# Patient Record
Sex: FEMALE | Race: WHITE | Employment: FULL TIME | URBAN - METROPOLITAN AREA
[De-identification: names, ages, dates, MRNs, and addresses within clinical notes are randomized per-mention and may not be internally consistent; named-entity substitution may affect disease eponyms.]

---

## 2019-09-17 ENCOUNTER — APPOINTMENT (OUTPATIENT)
Dept: CT IMAGING | Age: 39
End: 2019-09-17
Payer: COMMERCIAL

## 2019-09-17 ENCOUNTER — HOSPITAL ENCOUNTER (EMERGENCY)
Age: 39
Discharge: HOME | End: 2019-09-17
Payer: COMMERCIAL

## 2019-09-17 VITALS
TEMPERATURE: 98.1 F | DIASTOLIC BLOOD PRESSURE: 83 MMHG | WEIGHT: 293 LBS | SYSTOLIC BLOOD PRESSURE: 107 MMHG | OXYGEN SATURATION: 99 % | HEIGHT: 67 IN | BODY MASS INDEX: 45.99 KG/M2 | RESPIRATION RATE: 16 BRPM | HEART RATE: 96 BPM

## 2019-09-17 DIAGNOSIS — R42 EPISODIC LIGHTHEADEDNESS: Primary | ICD-10-CM

## 2019-09-17 LAB
ALBUMIN SERPL-MCNC: 4 GM/DL (ref 3.4–5)
ALP BLD-CCNC: 59 IU/L (ref 40–128)
ALT SERPL-CCNC: 13 U/L (ref 10–40)
ANION GAP SERPL CALCULATED.3IONS-SCNC: 11 MMOL/L (ref 4–16)
AST SERPL-CCNC: 14 IU/L (ref 15–37)
BACTERIA: ABNORMAL /HPF
BASOPHILS ABSOLUTE: 0.1 K/CU MM
BASOPHILS RELATIVE PERCENT: 0.4 % (ref 0–1)
BILIRUB SERPL-MCNC: 0.7 MG/DL (ref 0–1)
BILIRUBIN URINE: NEGATIVE MG/DL
BLOOD, URINE: ABNORMAL
BUN BLDV-MCNC: 11 MG/DL (ref 6–23)
CALCIUM SERPL-MCNC: 9.3 MG/DL (ref 8.3–10.6)
CHLORIDE BLD-SCNC: 103 MMOL/L (ref 99–110)
CLARITY: CLEAR
CO2: 24 MMOL/L (ref 21–32)
COLOR: YELLOW
CREAT SERPL-MCNC: 0.8 MG/DL (ref 0.6–1.1)
DIFFERENTIAL TYPE: ABNORMAL
EKG ATRIAL RATE: 105 BPM
EKG ATRIAL RATE: 94 BPM
EKG DIAGNOSIS: NORMAL
EKG DIAGNOSIS: NORMAL
EKG P AXIS: 53 DEGREES
EKG P AXIS: 68 DEGREES
EKG P-R INTERVAL: 142 MS
EKG P-R INTERVAL: 146 MS
EKG Q-T INTERVAL: 358 MS
EKG Q-T INTERVAL: 368 MS
EKG QRS DURATION: 94 MS
EKG QRS DURATION: 96 MS
EKG QTC CALCULATION (BAZETT): 460 MS
EKG QTC CALCULATION (BAZETT): 473 MS
EKG R AXIS: -31 DEGREES
EKG R AXIS: -4 DEGREES
EKG T AXIS: 28 DEGREES
EKG T AXIS: 48 DEGREES
EKG VENTRICULAR RATE: 105 BPM
EKG VENTRICULAR RATE: 94 BPM
EOSINOPHILS ABSOLUTE: 0.1 K/CU MM
EOSINOPHILS RELATIVE PERCENT: 1.1 % (ref 0–3)
GFR AFRICAN AMERICAN: >60 ML/MIN/1.73M2
GFR NON-AFRICAN AMERICAN: >60 ML/MIN/1.73M2
GLUCOSE BLD-MCNC: 104 MG/DL (ref 70–99)
GLUCOSE, URINE: NEGATIVE MG/DL
GONADOTROPIN, CHORIONIC (HCG) QUANT: NORMAL UIU/ML
HCT VFR BLD CALC: 47.2 % (ref 37–47)
HEMOGLOBIN: 15 GM/DL (ref 12.5–16)
IMMATURE NEUTROPHIL %: 0.3 % (ref 0–0.43)
KETONES, URINE: NEGATIVE MG/DL
LEUKOCYTE ESTERASE, URINE: NEGATIVE
LYMPHOCYTES ABSOLUTE: 2.9 K/CU MM
LYMPHOCYTES RELATIVE PERCENT: 24.7 % (ref 24–44)
MCH RBC QN AUTO: 28.8 PG (ref 27–31)
MCHC RBC AUTO-ENTMCNC: 31.8 % (ref 32–36)
MCV RBC AUTO: 90.8 FL (ref 78–100)
MONOCYTES ABSOLUTE: 0.8 K/CU MM
MONOCYTES RELATIVE PERCENT: 7.2 % (ref 0–4)
MUCUS: ABNORMAL HPF
NITRITE URINE, QUANTITATIVE: NEGATIVE
NON SQUAM EPI CELLS: <1 /HPF
NUCLEATED RBC %: 0 %
PDW BLD-RTO: 12.3 % (ref 11.7–14.9)
PH, URINE: 5 (ref 5–8)
PLATELET # BLD: 299 K/CU MM (ref 140–440)
PMV BLD AUTO: 9 FL (ref 7.5–11.1)
POTASSIUM SERPL-SCNC: 3.9 MMOL/L (ref 3.5–5.1)
PROTEIN UA: NEGATIVE MG/DL
RBC # BLD: 5.2 M/CU MM (ref 4.2–5.4)
RBC URINE: 3 /HPF (ref 0–6)
SEGMENTED NEUTROPHILS ABSOLUTE COUNT: 7.7 K/CU MM
SEGMENTED NEUTROPHILS RELATIVE PERCENT: 66.3 % (ref 36–66)
SODIUM BLD-SCNC: 138 MMOL/L (ref 135–145)
SPECIFIC GRAVITY UA: 1.05 (ref 1–1.03)
SPECIFIC GRAVITY UA: ABNORMAL (ref 1–1.03)
SQUAMOUS EPITHELIAL: 2 /HPF
TOTAL IMMATURE NEUTOROPHIL: 0.04 K/CU MM
TOTAL NUCLEATED RBC: 0 K/CU MM
TOTAL PROTEIN: 7.5 GM/DL (ref 6.4–8.2)
TRICHOMONAS: ABNORMAL /HPF
TROPONIN T: <0.01 NG/ML
UROBILINOGEN, URINE: NORMAL MG/DL (ref 0.2–1)
WBC # BLD: 11.6 K/CU MM (ref 4–10.5)
WBC UA: <1 /HPF (ref 0–5)

## 2019-09-17 PROCEDURE — 6360000004 HC RX CONTRAST MEDICATION: Performed by: PHYSICIAN ASSISTANT

## 2019-09-17 PROCEDURE — 71275 CT ANGIOGRAPHY CHEST: CPT

## 2019-09-17 PROCEDURE — 2580000003 HC RX 258: Performed by: PHYSICIAN ASSISTANT

## 2019-09-17 PROCEDURE — 84484 ASSAY OF TROPONIN QUANT: CPT

## 2019-09-17 PROCEDURE — 93005 ELECTROCARDIOGRAM TRACING: CPT | Performed by: EMERGENCY MEDICINE

## 2019-09-17 PROCEDURE — 93010 ELECTROCARDIOGRAM REPORT: CPT | Performed by: INTERNAL MEDICINE

## 2019-09-17 PROCEDURE — 80053 COMPREHEN METABOLIC PANEL: CPT

## 2019-09-17 PROCEDURE — 81001 URINALYSIS AUTO W/SCOPE: CPT

## 2019-09-17 PROCEDURE — 93005 ELECTROCARDIOGRAM TRACING: CPT | Performed by: PHYSICIAN ASSISTANT

## 2019-09-17 PROCEDURE — 96360 HYDRATION IV INFUSION INIT: CPT

## 2019-09-17 PROCEDURE — 85025 COMPLETE CBC W/AUTO DIFF WBC: CPT

## 2019-09-17 PROCEDURE — 99284 EMERGENCY DEPT VISIT MOD MDM: CPT

## 2019-09-17 PROCEDURE — 96361 HYDRATE IV INFUSION ADD-ON: CPT

## 2019-09-17 PROCEDURE — 84702 CHORIONIC GONADOTROPIN TEST: CPT

## 2019-09-17 RX ORDER — 0.9 % SODIUM CHLORIDE 0.9 %
1000 INTRAVENOUS SOLUTION INTRAVENOUS ONCE
Status: COMPLETED | OUTPATIENT
Start: 2019-09-17 | End: 2019-09-17

## 2019-09-17 RX ORDER — 0.9 % SODIUM CHLORIDE 0.9 %
1000 INTRAVENOUS SOLUTION INTRAVENOUS ONCE
Status: DISCONTINUED | OUTPATIENT
Start: 2019-09-17 | End: 2019-09-17 | Stop reason: HOSPADM

## 2019-09-17 RX ORDER — SODIUM CHLORIDE 0.9 % (FLUSH) 0.9 %
10 SYRINGE (ML) INJECTION 2 TIMES DAILY
Status: DISCONTINUED | OUTPATIENT
Start: 2019-09-17 | End: 2019-09-17 | Stop reason: HOSPADM

## 2019-09-17 RX ADMIN — IOPAMIDOL 80 ML: 755 INJECTION, SOLUTION INTRAVENOUS at 04:06

## 2019-09-17 RX ADMIN — SODIUM CHLORIDE 1000 ML: 9 INJECTION, SOLUTION INTRAVENOUS at 03:29

## 2019-09-17 RX ADMIN — Medication 10 ML: at 04:06

## 2019-09-17 SDOH — HEALTH STABILITY: MENTAL HEALTH: HOW OFTEN DO YOU HAVE A DRINK CONTAINING ALCOHOL?: NEVER

## 2019-09-17 ASSESSMENT — PAIN DESCRIPTION - PAIN TYPE: TYPE: ACUTE PAIN

## 2019-09-17 ASSESSMENT — PAIN SCALES - GENERAL: PAINLEVEL_OUTOF10: 7

## 2019-09-17 ASSESSMENT — PAIN DESCRIPTION - ORIENTATION: ORIENTATION: LEFT

## 2019-09-17 NOTE — ED PROVIDER NOTES
RBC % 0.0 %    Total Nucleated RBC 0.0 K/CU MM    Total Immature Neutrophil 0.04 K/CU MM    Immature Neutrophil % 0.3 0 - 0.43 %   CMP   Result Value Ref Range    Sodium 138 135 - 145 MMOL/L    Potassium 3.9 3.5 - 5.1 MMOL/L    Chloride 103 99 - 110 mMol/L    CO2 24 21 - 32 MMOL/L    BUN 11 6 - 23 MG/DL    CREATININE 0.8 0.6 - 1.1 MG/DL    Glucose 104 (H) 70 - 99 MG/DL    Calcium 9.3 8.3 - 10.6 MG/DL    Alb 4.0 3.4 - 5.0 GM/DL    Total Protein 7.5 6.4 - 8.2 GM/DL    Total Bilirubin 0.7 0.0 - 1.0 MG/DL    ALT 13 10 - 40 U/L    AST 14 (L) 15 - 37 IU/L    Alkaline Phosphatase 59 40 - 128 IU/L    GFR Non-African American >60 >60 mL/min/1.73m2    GFR African American >60 >60 mL/min/1.73m2    Anion Gap 11 4 - 16   HCG, Serum, Quantitative, Pregnancy (Lab)   Result Value Ref Range    hCG Quant 0.6                                          UIU/ML    hCG Quant EXPECTED VALUES IN PREGNANCY UIU/ML    hCG Quant    7-50               0.2-1 WEEK UIU/ML    hCG Quant                 1-2 WEEKS UIU/ML    hCG Quant    100-5000           2-3 WEEKS UIU/ML    hCG Quant    500-10,000         3-4 WEEKS UIU/ML    hCG Quant    1000-50,000        4-5 WEEKS UIU/ML    hCG Quant    10,000-100,000     5-6 WEEKS UIU/ML    hCG Quant    15,000-200,000     6-8 WEEKS UIU/ML    hCG Quant    10,000-100,000     2-3 MONTHS UIU/ML   Troponin   Result Value Ref Range    Troponin T <0.010 <0.01 NG/ML   Urinalysis   Result Value Ref Range    Color, UA YELLOW YELLOW    Clarity, UA CLEAR CLEAR    Glucose, Urine NEGATIVE NEGATIVE MG/DL    Bilirubin Urine NEGATIVE NEGATIVE MG/DL    Ketones, Urine NEGATIVE NEGATIVE MG/DL    Specific Gravity, UA 1.049 (H) 1.001 - 1.035    Specific Gravity, UA (H) 1.001 - 1.035     (NOTE)  CONSIDER URINE OSMOLARITY TEST IF CLINICALLY INDICATED        Blood, Urine SMALL (A) NEGATIVE    pH, Urine 5.0 5.0 - 8.0    Protein, UA NEGATIVE NEGATIVE MG/DL    Urobilinogen, Urine NORMAL 0.2 - 1.0 MG/DL    Nitrite Urine, Quantitative

## 2019-09-17 NOTE — ED NOTES
Pt states being from [address] and was here visiting a friend. Was driving back home when she began feeling dizzy. States pulling over and began having lower back pain and vomiting. States never felt like this before. Pt also states that jaw on both sides is feeling heavy.       Janny Harrison RN  09/17/19 0808

## 2024-04-01 ENCOUNTER — APPOINTMENT (OUTPATIENT)
Dept: PRIMARY CARE | Facility: CLINIC | Age: 44
End: 2024-04-01
Payer: COMMERCIAL

## 2024-04-03 ENCOUNTER — APPOINTMENT (OUTPATIENT)
Dept: RADIOLOGY | Facility: CLINIC | Age: 44
End: 2024-04-03
Payer: COMMERCIAL

## 2024-04-03 DIAGNOSIS — Z12.31 SCREENING MAMMOGRAM FOR BREAST CANCER: ICD-10-CM

## 2024-04-10 ENCOUNTER — HOSPITAL ENCOUNTER (OUTPATIENT)
Dept: RADIOLOGY | Facility: CLINIC | Age: 44
Discharge: HOME | End: 2024-04-10
Payer: COMMERCIAL

## 2024-04-10 ENCOUNTER — ANCILLARY ORDERS (OUTPATIENT)
Dept: RADIOLOGY | Facility: CLINIC | Age: 44
End: 2024-04-10
Payer: COMMERCIAL

## 2024-04-10 VITALS — WEIGHT: 293 LBS | HEIGHT: 67 IN | BODY MASS INDEX: 45.99 KG/M2

## 2024-04-10 DIAGNOSIS — N63.20 MASSES OF BOTH BREASTS: Primary | ICD-10-CM

## 2024-04-10 DIAGNOSIS — Z12.31 SCREENING MAMMOGRAM FOR BREAST CANCER: ICD-10-CM

## 2024-04-10 DIAGNOSIS — N63.10 MASSES OF BOTH BREASTS: Primary | ICD-10-CM

## 2024-04-10 PROCEDURE — 77063 BREAST TOMOSYNTHESIS BI: CPT | Performed by: RADIOLOGY

## 2024-04-10 PROCEDURE — 77067 SCR MAMMO BI INCL CAD: CPT | Performed by: RADIOLOGY

## 2024-04-10 PROCEDURE — 77067 SCR MAMMO BI INCL CAD: CPT

## 2024-04-16 ENCOUNTER — TELEPHONE (OUTPATIENT)
Dept: OBSTETRICS AND GYNECOLOGY | Facility: CLINIC | Age: 44
End: 2024-04-16
Payer: COMMERCIAL

## 2024-04-17 ENCOUNTER — HOSPITAL ENCOUNTER (OUTPATIENT)
Dept: RADIOLOGY | Facility: CLINIC | Age: 44
Discharge: HOME | End: 2024-04-17
Payer: COMMERCIAL

## 2024-04-17 DIAGNOSIS — N63.10 MASSES OF BOTH BREASTS: ICD-10-CM

## 2024-04-17 DIAGNOSIS — N63.20 MASSES OF BOTH BREASTS: ICD-10-CM

## 2024-04-17 PROCEDURE — 77065 DX MAMMO INCL CAD UNI: CPT | Mod: RIGHT SIDE | Performed by: STUDENT IN AN ORGANIZED HEALTH CARE EDUCATION/TRAINING PROGRAM

## 2024-04-17 PROCEDURE — 77061 BREAST TOMOSYNTHESIS UNI: CPT | Mod: RT

## 2024-04-17 PROCEDURE — 77061 BREAST TOMOSYNTHESIS UNI: CPT | Mod: RIGHT SIDE | Performed by: STUDENT IN AN ORGANIZED HEALTH CARE EDUCATION/TRAINING PROGRAM

## 2024-04-17 PROCEDURE — 76982 USE 1ST TARGET LESION: CPT

## 2024-04-17 PROCEDURE — 76642 ULTRASOUND BREAST LIMITED: CPT | Mod: RIGHT SIDE | Performed by: STUDENT IN AN ORGANIZED HEALTH CARE EDUCATION/TRAINING PROGRAM

## 2024-04-17 PROCEDURE — 76642 ULTRASOUND BREAST LIMITED: CPT | Mod: 50

## 2024-04-18 ENCOUNTER — OFFICE VISIT (OUTPATIENT)
Dept: OBSTETRICS AND GYNECOLOGY | Facility: CLINIC | Age: 44
End: 2024-04-18
Payer: COMMERCIAL

## 2024-04-18 VITALS
DIASTOLIC BLOOD PRESSURE: 70 MMHG | SYSTOLIC BLOOD PRESSURE: 110 MMHG | HEIGHT: 67 IN | WEIGHT: 293 LBS | BODY MASS INDEX: 45.99 KG/M2

## 2024-04-18 DIAGNOSIS — Z01.419 PAP TEST, AS PART OF ROUTINE GYNECOLOGICAL EXAMINATION: ICD-10-CM

## 2024-04-18 DIAGNOSIS — Z01.419 WOMEN'S ANNUAL ROUTINE GYNECOLOGICAL EXAMINATION: Primary | ICD-10-CM

## 2024-04-18 PROCEDURE — 87624 HPV HI-RISK TYP POOLED RSLT: CPT

## 2024-04-18 PROCEDURE — 99396 PREV VISIT EST AGE 40-64: CPT | Performed by: OBSTETRICS & GYNECOLOGY

## 2024-04-18 PROCEDURE — 88142 CYTOPATH C/V THIN LAYER: CPT

## 2024-04-18 PROCEDURE — 1036F TOBACCO NON-USER: CPT | Performed by: OBSTETRICS & GYNECOLOGY

## 2024-04-18 ASSESSMENT — ENCOUNTER SYMPTOMS
GASTROINTESTINAL NEGATIVE: 0
CARDIOVASCULAR NEGATIVE: 0
HEMATOLOGIC/LYMPHATIC NEGATIVE: 0
EYES NEGATIVE: 0
ENDOCRINE NEGATIVE: 0
MUSCULOSKELETAL NEGATIVE: 0
CONSTITUTIONAL NEGATIVE: 0
PSYCHIATRIC NEGATIVE: 0
NEUROLOGICAL NEGATIVE: 0
RESPIRATORY NEGATIVE: 0
ALLERGIC/IMMUNOLOGIC NEGATIVE: 0

## 2024-04-18 ASSESSMENT — PATIENT HEALTH QUESTIONNAIRE - PHQ9
SUM OF ALL RESPONSES TO PHQ9 QUESTIONS 1 AND 2: 0
1. LITTLE INTEREST OR PLEASURE IN DOING THINGS: NOT AT ALL
2. FEELING DOWN, DEPRESSED OR HOPELESS: NOT AT ALL

## 2024-04-18 ASSESSMENT — PAIN SCALES - GENERAL: PAINLEVEL: 0-NO PAIN

## 2024-04-18 NOTE — RESULT ENCOUNTER NOTE
Follow-up diagnostic mammogram shows bilateral probable benign cysts.  Recommend short-term follow-up 6 months

## 2024-04-18 NOTE — PROGRESS NOTES
"Chalise N Merlin is a 43 y.o. female who is here for a routine exam. PCP = Jonathan Garcia MD  Patient for annual exam no complaints did discuss that she had some pinkish discharge post intercourse approximately 3 weeks ago.  Notes it was painful during that intercourse.  Usually sex is not painful and she does not have any issues    Review of Systems  Episode of vaginal bleeding and pain with intercourse.  All other systems negative    Physical Exam  Constitutional:       Appearance: Normal appearance. She is obese.   Genitourinary:      Genitourinary Comments: External genitalia unremarkable  Vagina clear  Cervix closed uterus normal does not appear enlarged  Adnexa without masses or tenderness  Perineum without inflammation or lesions    Breast without masses tenderness or nipple discharge, normal appearance   Breasts:     Breasts are soft.     Right: Normal.      Left: Normal.   HENT:      Head: Normocephalic.      Nose: Nose normal.   Eyes:      Pupils: Pupils are equal, round, and reactive to light.   Cardiovascular:      Rate and Rhythm: Normal rate and regular rhythm.   Pulmonary:      Effort: Pulmonary effort is normal.      Breath sounds: Normal breath sounds.   Abdominal:      General: Abdomen is flat. Bowel sounds are normal.      Palpations: Abdomen is soft.   Musculoskeletal:         General: Normal range of motion.      Cervical back: Normal range of motion and neck supple.   Neurological:      General: No focal deficit present.      Mental Status: She is alert.   Skin:     General: Skin is warm and dry.   Psychiatric:         Mood and Affect: Mood normal.         Behavior: Behavior normal.         Thought Content: Thought content normal.         Judgment: Judgment normal.     Objective   /70   Ht 1.702 m (5' 7\")   Wt (!) 157 kg (346 lb)   LMP 2024 (Exact Date)   BMI 54.19 kg/m²   OB History          10    Para   5    Term   5            AB   5    Living   5         SAB "   2    IAB        Ectopic        Multiple        Live Births                  GynHx:  Menarche/Menopause: 11  Periods are regular every 28-30 days, lasting 5 days.  Dysmenorrhea: none.   Cyclic symptoms include none.    Social History     Substance and Sexual Activity   Sexual Activity Yes    Partners: Male    Birth control/protection: None     Current contraception: None  STIs: none    Substance:   Tobacco Use: Low Risk  (4/18/2024)    Patient History     Smoking Tobacco Use: Never     Smokeless Tobacco Use: Never     Passive Exposure: Not on file      Social History     Substance and Sexual Activity   Drug Use Never      Social History     Substance and Sexual Activity   Alcohol Use Yes    Comment: Socially     Abuse: No  Depression Screen:   Denies history of depression    Past med hx and past surg hx reviewed     Assessment/Plan      Markable annual GYN exam.  Patient is sexually active steady partner declining STD testing.  Patient currently is not using birth control.  Discussed with patient encouraged to least use barrier methods.  Patient will consider.  Discussed diet and exercise.  Patient notes she has lost some weight recently.  Mammogram results reviewed.  Pap smear obtained.  Return to office in 1 year or as needed

## 2024-04-19 PROBLEM — N63.20 MASSES OF BOTH BREASTS: Status: ACTIVE | Noted: 2024-04-17

## 2024-04-19 PROBLEM — N63.10 MASSES OF BOTH BREASTS: Status: ACTIVE | Noted: 2024-04-17

## 2024-04-19 PROBLEM — E66.01 MORBID OBESITY DUE TO EXCESS CALORIES (MULTI): Status: ACTIVE | Noted: 2017-03-31

## 2024-04-19 PROBLEM — N93.9 ABNORMAL UTERINE BLEEDING: Status: ACTIVE | Noted: 2024-04-19

## 2024-04-19 PROBLEM — O20.0 THREATENED ABORTION IN FIRST TRIMESTER (HHS-HCC): Status: ACTIVE | Noted: 2024-04-19

## 2024-04-19 PROBLEM — R35.0 INCREASED URINARY FREQUENCY: Status: ACTIVE | Noted: 2024-04-19

## 2024-04-19 PROBLEM — O36.80X0 PREGNANCY, LOCATION UNKNOWN (HHS-HCC): Status: ACTIVE | Noted: 2024-04-19

## 2024-04-19 PROBLEM — R73.09 ABNORMAL BLOOD SUGAR: Status: ACTIVE | Noted: 2024-04-19

## 2024-04-19 PROBLEM — Z86.711 PERSONAL HISTORY OF PULMONARY EMBOLISM: Status: ACTIVE | Noted: 2018-11-14

## 2024-04-19 PROBLEM — R74.01 HIGH ALANINE AMINOTRANSFERASE (ALT) LEVEL: Status: ACTIVE | Noted: 2024-03-25

## 2024-04-19 PROBLEM — N93.0 POSTCOITAL BLEEDING: Status: ACTIVE | Noted: 2024-04-19

## 2024-04-19 PROBLEM — K21.9 GASTROESOPHAGEAL REFLUX DISEASE: Status: ACTIVE | Noted: 2018-11-14

## 2024-04-19 PROBLEM — N92.6 IRREGULAR MENSES: Status: ACTIVE | Noted: 2024-04-19

## 2024-04-19 PROBLEM — N39.0 ACUTE LOWER URINARY TRACT INFECTION: Status: ACTIVE | Noted: 2024-04-19

## 2024-04-19 PROBLEM — R39.15 URGENCY OF URINATION: Status: ACTIVE | Noted: 2024-04-19

## 2024-04-19 PROBLEM — I10 ESSENTIAL (PRIMARY) HYPERTENSION: Status: ACTIVE | Noted: 2018-11-14

## 2024-04-19 PROBLEM — Z20.822 SUSPECTED COVID-19 VIRUS INFECTION: Status: ACTIVE | Noted: 2024-04-19

## 2024-04-19 PROBLEM — N94.19 FUNCTIONAL DYSPAREUNIA: Status: ACTIVE | Noted: 2024-04-19

## 2024-04-19 PROBLEM — H16.9 KERATITIS, RIGHT: Status: ACTIVE | Noted: 2024-04-19

## 2024-04-19 PROBLEM — R10.2 FEMALE PELVIC PAIN: Status: ACTIVE | Noted: 2024-04-19

## 2024-04-19 PROBLEM — T83.32XA MALPOSITIONED INTRAUTERINE DEVICE (IUD): Status: ACTIVE | Noted: 2024-04-19

## 2024-04-19 PROBLEM — N76.0 VAGINITIS AND VULVOVAGINITIS: Status: ACTIVE | Noted: 2020-10-19

## 2024-04-19 PROBLEM — R74.01 HIGH ASPARTATE AMINOTRANSFERASE LEVEL: Status: ACTIVE | Noted: 2024-03-25

## 2024-04-19 PROBLEM — R51.9 HEADACHE: Status: ACTIVE | Noted: 2024-04-19

## 2024-04-19 PROBLEM — R31.9 HEMATURIA OF UNDIAGNOSED CAUSE: Status: ACTIVE | Noted: 2024-04-19

## 2024-04-19 PROBLEM — Z97.5 PRESENCE OF INTRAUTERINE CONTRACEPTIVE DEVICE: Status: ACTIVE | Noted: 2024-04-19

## 2024-04-19 PROBLEM — N91.2 AMENORRHEA: Status: ACTIVE | Noted: 2024-04-19

## 2024-04-19 RX ORDER — AMLODIPINE BESYLATE 5 MG/1
5 TABLET ORAL DAILY
COMMUNITY
Start: 2024-03-06

## 2024-04-19 RX ORDER — ASPIRIN 81 MG/1
81 TABLET ORAL DAILY
COMMUNITY
Start: 2024-03-06

## 2024-04-19 RX ORDER — METOPROLOL SUCCINATE 50 MG/1
50 TABLET, EXTENDED RELEASE ORAL DAILY
COMMUNITY
Start: 2024-03-06

## 2024-04-19 RX ORDER — ATORVASTATIN CALCIUM 20 MG/1
20 TABLET, FILM COATED ORAL DAILY
COMMUNITY
Start: 2024-03-06

## 2024-04-22 ENCOUNTER — OFFICE VISIT (OUTPATIENT)
Dept: CARDIOLOGY | Facility: CLINIC | Age: 44
End: 2024-04-22
Payer: COMMERCIAL

## 2024-04-22 VITALS
WEIGHT: 293 LBS | DIASTOLIC BLOOD PRESSURE: 83 MMHG | HEART RATE: 63 BPM | BODY MASS INDEX: 45.99 KG/M2 | HEIGHT: 67 IN | OXYGEN SATURATION: 96 % | SYSTOLIC BLOOD PRESSURE: 121 MMHG

## 2024-04-22 DIAGNOSIS — R60.0 BILATERAL LOWER EXTREMITY EDEMA: ICD-10-CM

## 2024-04-22 DIAGNOSIS — K75.81 NASH (NONALCOHOLIC STEATOHEPATITIS): ICD-10-CM

## 2024-04-22 DIAGNOSIS — R35.1 NOCTURIA: ICD-10-CM

## 2024-04-22 DIAGNOSIS — I50.32 CHRONIC HEART FAILURE WITH PRESERVED EJECTION FRACTION (MULTI): ICD-10-CM

## 2024-04-22 DIAGNOSIS — G47.33 OSA (OBSTRUCTIVE SLEEP APNEA): ICD-10-CM

## 2024-04-22 DIAGNOSIS — E78.5 HYPERLIPIDEMIA, UNSPECIFIED HYPERLIPIDEMIA TYPE: ICD-10-CM

## 2024-04-22 DIAGNOSIS — R79.89 ELEVATED BRAIN NATRIURETIC PEPTIDE (BNP) LEVEL: ICD-10-CM

## 2024-04-22 DIAGNOSIS — R94.31 ABNORMAL EKG: Primary | ICD-10-CM

## 2024-04-22 DIAGNOSIS — I10 ESSENTIAL HYPERTENSION: ICD-10-CM

## 2024-04-22 PROCEDURE — 1036F TOBACCO NON-USER: CPT | Performed by: STUDENT IN AN ORGANIZED HEALTH CARE EDUCATION/TRAINING PROGRAM

## 2024-04-22 PROCEDURE — 3079F DIAST BP 80-89 MM HG: CPT | Performed by: STUDENT IN AN ORGANIZED HEALTH CARE EDUCATION/TRAINING PROGRAM

## 2024-04-22 PROCEDURE — 93005 ELECTROCARDIOGRAM TRACING: CPT | Performed by: STUDENT IN AN ORGANIZED HEALTH CARE EDUCATION/TRAINING PROGRAM

## 2024-04-22 PROCEDURE — 99213 OFFICE O/P EST LOW 20 MIN: CPT | Performed by: STUDENT IN AN ORGANIZED HEALTH CARE EDUCATION/TRAINING PROGRAM

## 2024-04-22 PROCEDURE — 99203 OFFICE O/P NEW LOW 30 MIN: CPT | Performed by: STUDENT IN AN ORGANIZED HEALTH CARE EDUCATION/TRAINING PROGRAM

## 2024-04-22 PROCEDURE — 3074F SYST BP LT 130 MM HG: CPT | Performed by: STUDENT IN AN ORGANIZED HEALTH CARE EDUCATION/TRAINING PROGRAM

## 2024-04-22 ASSESSMENT — COLUMBIA-SUICIDE SEVERITY RATING SCALE - C-SSRS
2. HAVE YOU ACTUALLY HAD ANY THOUGHTS OF KILLING YOURSELF?: NO
1. IN THE PAST MONTH, HAVE YOU WISHED YOU WERE DEAD OR WISHED YOU COULD GO TO SLEEP AND NOT WAKE UP?: NO
6. HAVE YOU EVER DONE ANYTHING, STARTED TO DO ANYTHING, OR PREPARED TO DO ANYTHING TO END YOUR LIFE?: NO

## 2024-04-22 ASSESSMENT — ENCOUNTER SYMPTOMS
LOSS OF SENSATION IN FEET: 0
DEPRESSION: 0
OCCASIONAL FEELINGS OF UNSTEADINESS: 0

## 2024-04-22 ASSESSMENT — PAIN SCALES - GENERAL: PAINLEVEL: 0-NO PAIN

## 2024-04-22 NOTE — PATIENT INSTRUCTIONS
Dear Chalise N Merlin,    It was a pleasure meeting you today at the Cardiology office. As we dicussed, your clinical condition is high blood pressure, lower extremity edema, abnormal blood work, and high blood cholesterol. I recommended a coronary calcium score CT, a transthoracic echocardiogram, and a treadmill stress test. I will contact you once your results are available to give you my impressions through [employer].    Sincerely,     Rojelio Bettencourt MD

## 2024-04-22 NOTE — PROGRESS NOTES
Location of visit: [facility] [address] [place] [address]   Type of Visit: New    Chief Complaint:  Patient was referred to Cardiology for Follow-up post ED visit.    History Of Present Illness:    Chalise N Merlin is a 43 y.o. female, with history significant for pulmonary embolism cryptogenic and AC for 1 year, HTN, HLD, low vitamin D,  transaminitis with fatty liver ?WHITFIELD, MARINA, and BMI 54, who visits Cardiology today as a new patient  for high blood pressure and abnormal ECG. At the beginning of March she went to [facility] ED for headache and lower extremity edema after a road trip. Blood pressure was elevated at 190 mmHg and she got an ECG done which was apparently reported as abnormal (no significant abnormalities on my review). She was started on amlodipine 5, metoprolol XL 50, ASA 81, atorvastatin 20 (), and vitamin D and was referred to Cardiology. Troponin was normal and proBNP was mildly elevated at 189. She reports nocturia, no new evidence of edema. She wants to start working out but is concerned of the abnormal tests, the recent diagnosis and new cardiac symptoms.     Patient denies chest pain, dyspnea on exertion, shortness of breath, orthopnea, PND, palpitations, dizziness, lightheadedness, syncope, claudication, or snoring/apnea.    Blood pressure today: 121/83 mmHg    Today's ECG shows sinus rhythm at 63 bpm, normal AV conduction, normal QTc, and normal ventricular repolarization.    Past Medical History:  She has no past medical history on file.    Past Surgical History:  She has a past surgical history that includes Other surgical history (12/03/2018) and Other surgical history (12/03/2018).    Social History:  She reports that she has never smoked. She has never used smokeless tobacco. She reports current alcohol use. She reports that she does not use drugs.    Family History:  Family History   Family history unknown: Yes     Allergies:  Seasonale (91) [levonorgestrel-ethinyl estrad]    Outpatient  "Medications:  Current Outpatient Medications   Medication Instructions    amLODIPine (NORVASC) 5 mg, oral, Daily    aspirin 81 mg, oral, Daily    atorvastatin (LIPITOR) 20 mg, oral, Daily    metoprolol succinate XL (TOPROL-XL) 50 mg, oral, Daily     Last Recorded Vitals:  Vitals:    04/22/24 1625 04/22/24 1626   BP: 133/86 121/83   BP Location: Left arm Right arm   Patient Position: Sitting Sitting   BP Cuff Size: Thigh Thigh   Pulse: 63    SpO2: 96%    Weight: (!) 157 kg (346 lb 1.6 oz)    Height: 1.702 m (5' 7\")      Physical Exam:      4/22/2024     4:26 PM 4/22/2024     4:25 PM 4/18/2024     2:55 PM 4/10/2024     3:27 PM 6/22/2021    11:10 AM 6/15/2021    11:55 AM   Vitals   Systolic 121 133 110  124    Diastolic 83 86 70  86    Heart Rate  63       Height (in)  1.702 m (5' 7\") 1.702 m (5' 7\") 1.702 m (5' 7\") 1.702 m (5' 7\") 1.702 m (5' 7\")   Weight (lb)  346.1 346 340 334 334   BMI  54.21 kg/m2 54.19 kg/m2 53.25 kg/m2 52.31 kg/m2 52.31 kg/m2   BSA (m2)  2.72 m2 2.72 m2 2.7 m2 2.68 m2 2.68 m2   Visit Report Report Report Report        Wt Readings from Last 5 Encounters:   04/22/24 (!) 157 kg (346 lb 1.6 oz)   04/18/24 (!) 157 kg (346 lb)   04/10/24 (!) 154 kg (340 lb)   06/22/21 (!) 152 kg (334 lb)   06/15/21 (!) 152 kg (334 lb)     General: Sitting up comfortably in chair; in no apparent distress.  HEENT: Normocephalic; atraumatic. Well hydrated.  Eyes: Anicteric sclera. Extraocular movement intact.  Neck: Supple; no thyromegaly; normal jugular venous pressure, no bruits.  Respiratory: Bilateral air entry equal. No wheezing.  Cardiovascular: Normal S1, S2; no murmurs auscultated.  Abdomen: Nondistended; nontender. (+) bowel sounds.  Extremities: No peripheral edema present. Pulses 2+ diffusely.  Neurological: Oriented to time, place, and person; nonfocal.  Psychiatric: Normal affect.     Last Labs Reviewed:  CBC -  Recent Labs     06/21/21  1440 11/12/18  1549   WBC 9.5 10.2   HGB 14.0 15.5   HCT 42.0 47.0* "    276   MCV 93 95     CMP -  Recent Labs     11/12/18  1549      K 4.6      CO2 30   ANIONGAP 8*   BUN 10   CREATININE 0.69   CALCIUM 9.3     Last Cardiology/Imaging Tests Personally Reviewed (if images available) and Interpreted:  ECG:  No results found.    Echocardiogram:  No results found. Reviewed Care Everywhere and no prior echocardiogram available.     Cath:  No results found.    Stress Test:  No results found.    Cardiac CT/MRI:  No results found.    CV RISK FACTORS:   # Hypertension: Last BP: 121/83.  # Hyperlipidemia: Last Tchol No results found for requested labs within last 365 days. / LDL No results found for requested labs within last 365 days. / HDL No results found for requested labs within last 365 days. / TRIG No results found for requested labs within last 365 days. (No results in last year.).  # Type II Diabetes Mellitus: Last A1c No results found for requested labs within last 365 days. (No results in last year.).  # Obesity: Last BMI: 54.19.  # CKD: Last BUN/Cr (GFR): No results found for requested labs within last 365 days./No results found for requested labs within last 365 days. (No results found for requested labs within last 365 days.), No results in last year..    ASCV RISK:  The ASCVD Risk score (Elizabeth DK, et al., 2019) failed to calculate for the following reasons:    Cannot find a previous HDL lab    Cannot find a previous total cholesterol lab    Assessment/Plan   43 y.o. female, with history significant for pulmonary embolism cryptogenic and AC for 1 year, HTN, HLD, low vitamin D,  transaminitis with fatty liver ?WHITFIELD, MARINA, and BMI 54, who visits Cardiology today as a new patient  for high blood pressure and abnormal ECG. Symptoms could be related to HFpEF and HTN, now controlled with CCB and beta blocker. Nocturia persists x1-2. No high risk elements like chest pain, palpitations or syncope. No other signs to suggest RV failure post PE.  Patient asked if she  could get off blood pressure meds and explained that if she normalize her weight and treat the MARINA she may not need them in the future. No abnormalities found on prior ECG at ED and today's tracing.    #HTN  #Nocturia/elevated BNP/lower extremity edema, HFpEF?  #MARINA  #WHITFIELD  #HLD  # abnormal ECG    Recommendations:  - Continue amlodipine and metoprolol  - Coronary calcium score CT  - Transthoracic echocardiogram in the presence of heart failure symptoms since March 2024 (edema, nocturia), labs compatible with increased filling pressures (elevated NT-proBNP), and CV risk factors for CHF (HTN, elevated BMI)  - Stress test to assess blood pressure response in exercise  - She already had a referral for GI  - Complete her sleep test and treatment if indicated  - I will contact the patient once the results are available through [employer]  - I spent 55 minutes assessing the case between pre-charting, face-to-face patient interaction, and documentation    Rojelio Bettencourt MD

## 2024-04-26 LAB
CYTOLOGY CMNT CVX/VAG CYTO-IMP: NORMAL
HPV HR GENOTYPES PNL CVX NAA+PROBE: POSITIVE
LAB AP HPV GENOTYPE QUESTION: NO
LAB AP HPV HR: NORMAL
LABORATORY COMMENT REPORT: NORMAL
LABORATORY COMMENT REPORT: NORMAL
PATH REPORT.TOTAL CANCER: NORMAL

## 2024-04-29 LAB
ATRIAL RATE: 63 BPM
P AXIS: 32 DEGREES
P OFFSET: 202 MS
P ONSET: 148 MS
PR INTERVAL: 150 MS
Q ONSET: 223 MS
QRS COUNT: 10 BEATS
QRS DURATION: 108 MS
QT INTERVAL: 430 MS
QTC CALCULATION(BAZETT): 440 MS
QTC FREDERICIA: 437 MS
R AXIS: -4 DEGREES
T AXIS: 41 DEGREES
T OFFSET: 438 MS
VENTRICULAR RATE: 63 BPM

## 2024-05-17 ENCOUNTER — APPOINTMENT (OUTPATIENT)
Dept: CARDIOLOGY | Facility: CLINIC | Age: 44
End: 2024-05-17
Payer: COMMERCIAL

## 2024-05-30 ENCOUNTER — HOSPITAL ENCOUNTER (OUTPATIENT)
Dept: CARDIOLOGY | Facility: HOSPITAL | Age: 44
Discharge: HOME | End: 2024-05-30
Payer: COMMERCIAL

## 2024-05-30 ENCOUNTER — APPOINTMENT (OUTPATIENT)
Dept: CARDIOLOGY | Facility: HOSPITAL | Age: 44
End: 2024-05-30
Payer: COMMERCIAL

## 2024-05-30 VITALS
BODY MASS INDEX: 44.41 KG/M2 | DIASTOLIC BLOOD PRESSURE: 83 MMHG | SYSTOLIC BLOOD PRESSURE: 121 MMHG | HEIGHT: 68 IN | WEIGHT: 293 LBS

## 2024-05-30 DIAGNOSIS — R35.1 NOCTURIA: ICD-10-CM

## 2024-05-30 DIAGNOSIS — I50.32 CHRONIC HEART FAILURE WITH PRESERVED EJECTION FRACTION (MULTI): ICD-10-CM

## 2024-05-30 DIAGNOSIS — R60.0 BILATERAL LOWER EXTREMITY EDEMA: ICD-10-CM

## 2024-05-30 DIAGNOSIS — I10 ESSENTIAL HYPERTENSION: ICD-10-CM

## 2024-05-30 DIAGNOSIS — R79.89 ELEVATED BRAIN NATRIURETIC PEPTIDE (BNP) LEVEL: ICD-10-CM

## 2024-05-30 LAB
AORTIC VALVE MEAN GRADIENT: 4.4 MMHG
AORTIC VALVE PEAK VELOCITY: 1.36 M/S
AV PEAK GRADIENT: 7.4 MMHG
AVA (PEAK VEL): 3.85 CM2
AVA (VTI): 4.05 CM2
EJECTION FRACTION APICAL 4 CHAMBER: 56.8
LEFT ATRIUM VOLUME AREA LENGTH INDEX BSA: 28 ML/M2
LEFT VENTRICLE INTERNAL DIMENSION DIASTOLE: 4.56 CM (ref 3.5–6)
LEFT VENTRICULAR OUTFLOW TRACT DIAMETER: 2.35 CM
LV EJECTION FRACTION BIPLANE: 66 %
MITRAL VALVE E/A RATIO: 0.88
MITRAL VALVE E/E' RATIO: 5.94
RIGHT VENTRICLE FREE WALL PEAK S': 16 CM/S
RIGHT VENTRICLE PEAK SYSTOLIC PRESSURE: 20 MMHG
TRICUSPID ANNULAR PLANE SYSTOLIC EXCURSION: 2 CM

## 2024-05-30 PROCEDURE — 93306 TTE W/DOPPLER COMPLETE: CPT

## 2024-05-30 PROCEDURE — 93306 TTE W/DOPPLER COMPLETE: CPT | Performed by: STUDENT IN AN ORGANIZED HEALTH CARE EDUCATION/TRAINING PROGRAM

## 2024-05-31 ENCOUNTER — HOSPITAL ENCOUNTER (OUTPATIENT)
Dept: RADIOLOGY | Facility: CLINIC | Age: 44
Discharge: HOME | End: 2024-05-31
Payer: COMMERCIAL

## 2024-05-31 DIAGNOSIS — I10 ESSENTIAL HYPERTENSION: ICD-10-CM

## 2024-05-31 DIAGNOSIS — E78.5 HYPERLIPIDEMIA, UNSPECIFIED HYPERLIPIDEMIA TYPE: ICD-10-CM

## 2024-05-31 PROCEDURE — 75571 CT HRT W/O DYE W/CA TEST: CPT

## 2024-06-20 ENCOUNTER — APPOINTMENT (OUTPATIENT)
Dept: CARDIOLOGY | Facility: HOSPITAL | Age: 44
End: 2024-06-20
Payer: COMMERCIAL

## 2024-06-24 ENCOUNTER — APPOINTMENT (OUTPATIENT)
Dept: CARDIOLOGY | Facility: HOSPITAL | Age: 44
End: 2024-06-24
Payer: COMMERCIAL

## 2024-07-22 ENCOUNTER — APPOINTMENT (OUTPATIENT)
Dept: OBSTETRICS AND GYNECOLOGY | Facility: CLINIC | Age: 44
End: 2024-07-22
Payer: COMMERCIAL

## 2024-07-22 DIAGNOSIS — R10.2 PELVIC PRESSURE IN FEMALE: ICD-10-CM

## 2024-07-22 PROCEDURE — 87086 URINE CULTURE/COLONY COUNT: CPT

## 2024-07-22 PROCEDURE — 99212 OFFICE O/P EST SF 10 MIN: CPT | Performed by: NURSE PRACTITIONER

## 2024-07-22 PROCEDURE — 87186 SC STD MICRODIL/AGAR DIL: CPT

## 2024-07-22 RX ORDER — NITROFURANTOIN 25; 75 MG/1; MG/1
100 CAPSULE ORAL 2 TIMES DAILY
Qty: 14 CAPSULE | Refills: 0 | Status: SHIPPED | OUTPATIENT
Start: 2024-07-22 | End: 2024-07-24 | Stop reason: SDUPTHER

## 2024-07-22 NOTE — PROGRESS NOTES
Patient stopped into office due to signs and symptoms of a UTI  Dr Day cancelled clinic for this afternoon  Patient left a urine sample for a urine culture  Orders for Macrobid pended to Sehlley Isidro RN

## 2024-07-24 DIAGNOSIS — R10.2 PELVIC PRESSURE IN FEMALE: ICD-10-CM

## 2024-07-24 RX ORDER — NITROFURANTOIN 25; 75 MG/1; MG/1
100 CAPSULE ORAL 2 TIMES DAILY
Qty: 14 CAPSULE | Refills: 0 | Status: SHIPPED | OUTPATIENT
Start: 2024-07-24 | End: 2024-07-31

## 2024-07-25 LAB — BACTERIA UR CULT: ABNORMAL

## 2024-07-30 ENCOUNTER — APPOINTMENT (OUTPATIENT)
Dept: CARDIOLOGY | Facility: CLINIC | Age: 44
End: 2024-07-30
Payer: COMMERCIAL

## 2024-10-19 ENCOUNTER — APPOINTMENT (OUTPATIENT)
Dept: RADIOLOGY | Facility: HOSPITAL | Age: 44
End: 2024-10-19
Payer: COMMERCIAL

## 2024-10-19 ENCOUNTER — HOSPITAL ENCOUNTER (OUTPATIENT)
Facility: HOSPITAL | Age: 44
Setting detail: OBSERVATION
End: 2024-10-19
Attending: EMERGENCY MEDICINE | Admitting: INTERNAL MEDICINE
Payer: COMMERCIAL

## 2024-10-19 DIAGNOSIS — R65.10 SIRS (SYSTEMIC INFLAMMATORY RESPONSE SYNDROME) (MULTI): Primary | ICD-10-CM

## 2024-10-19 DIAGNOSIS — R79.89 ELEVATED BRAIN NATRIURETIC PEPTIDE (BNP) LEVEL: ICD-10-CM

## 2024-10-19 DIAGNOSIS — R31.9 URINARY TRACT INFECTION WITH HEMATURIA, SITE UNSPECIFIED: ICD-10-CM

## 2024-10-19 DIAGNOSIS — R60.0 BILATERAL LOWER EXTREMITY EDEMA: ICD-10-CM

## 2024-10-19 DIAGNOSIS — R35.1 NOCTURIA: ICD-10-CM

## 2024-10-19 DIAGNOSIS — N39.0 URINARY TRACT INFECTION WITH HEMATURIA, SITE UNSPECIFIED: ICD-10-CM

## 2024-10-19 LAB
ALBUMIN SERPL BCP-MCNC: 3.8 G/DL (ref 3.4–5)
ALP SERPL-CCNC: 80 U/L (ref 33–110)
ALT SERPL W P-5'-P-CCNC: 32 U/L (ref 7–45)
ANION GAP SERPL CALC-SCNC: 13 MMOL/L (ref 10–20)
APPEARANCE UR: ABNORMAL
AST SERPL W P-5'-P-CCNC: 19 U/L (ref 9–39)
BILIRUB SERPL-MCNC: 1 MG/DL (ref 0–1.2)
BILIRUB UR STRIP.AUTO-MCNC: NEGATIVE MG/DL
BUN SERPL-MCNC: 6 MG/DL (ref 6–23)
CALCIUM SERPL-MCNC: 9.1 MG/DL (ref 8.6–10.3)
CHLORIDE SERPL-SCNC: 98 MMOL/L (ref 98–107)
CO2 SERPL-SCNC: 26 MMOL/L (ref 21–32)
COLOR UR: YELLOW
CREAT SERPL-MCNC: 0.88 MG/DL (ref 0.5–1.05)
EGFRCR SERPLBLD CKD-EPI 2021: 82 ML/MIN/1.73M*2
FLUAV RNA RESP QL NAA+PROBE: NOT DETECTED
FLUBV RNA RESP QL NAA+PROBE: NOT DETECTED
GLUCOSE SERPL-MCNC: 143 MG/DL (ref 74–99)
GLUCOSE UR STRIP.AUTO-MCNC: NORMAL MG/DL
KETONES UR STRIP.AUTO-MCNC: NEGATIVE MG/DL
LEUKOCYTE ESTERASE UR QL STRIP.AUTO: ABNORMAL
MUCOUS THREADS #/AREA URNS AUTO: ABNORMAL /LPF
NITRITE UR QL STRIP.AUTO: NEGATIVE
PH UR STRIP.AUTO: 6.5 [PH]
POTASSIUM SERPL-SCNC: 3.4 MMOL/L (ref 3.5–5.3)
PROT SERPL-MCNC: 7.3 G/DL (ref 6.4–8.2)
PROT UR STRIP.AUTO-MCNC: ABNORMAL MG/DL
RBC # UR STRIP.AUTO: ABNORMAL /UL
RBC #/AREA URNS AUTO: >20 /HPF
SARS-COV-2 RNA RESP QL NAA+PROBE: NOT DETECTED
SODIUM SERPL-SCNC: 134 MMOL/L (ref 136–145)
SP GR UR STRIP.AUTO: 1.01
SQUAMOUS #/AREA URNS AUTO: ABNORMAL /HPF
UROBILINOGEN UR STRIP.AUTO-MCNC: NORMAL MG/DL
WBC #/AREA URNS AUTO: >50 /HPF
WBC CLUMPS #/AREA URNS AUTO: ABNORMAL /HPF

## 2024-10-19 PROCEDURE — 81025 URINE PREGNANCY TEST: CPT | Performed by: NURSE PRACTITIONER

## 2024-10-19 PROCEDURE — 71045 X-RAY EXAM CHEST 1 VIEW: CPT | Performed by: RADIOLOGY

## 2024-10-19 PROCEDURE — 85025 COMPLETE CBC W/AUTO DIFF WBC: CPT | Performed by: NURSE PRACTITIONER

## 2024-10-19 PROCEDURE — 99285 EMERGENCY DEPT VISIT HI MDM: CPT | Mod: 25

## 2024-10-19 PROCEDURE — 36415 COLL VENOUS BLD VENIPUNCTURE: CPT | Performed by: NURSE PRACTITIONER

## 2024-10-19 PROCEDURE — 80053 COMPREHEN METABOLIC PANEL: CPT | Performed by: NURSE PRACTITIONER

## 2024-10-19 PROCEDURE — 87636 SARSCOV2 & INF A&B AMP PRB: CPT | Performed by: EMERGENCY MEDICINE

## 2024-10-19 PROCEDURE — 96361 HYDRATE IV INFUSION ADD-ON: CPT | Mod: 59

## 2024-10-19 PROCEDURE — 81001 URINALYSIS AUTO W/SCOPE: CPT | Performed by: EMERGENCY MEDICINE

## 2024-10-19 PROCEDURE — 87636 SARSCOV2 & INF A&B AMP PRB: CPT | Performed by: INTERNAL MEDICINE

## 2024-10-19 PROCEDURE — 87186 SC STD MICRODIL/AGAR DIL: CPT | Mod: AHULAB | Performed by: EMERGENCY MEDICINE

## 2024-10-19 PROCEDURE — 71045 X-RAY EXAM CHEST 1 VIEW: CPT

## 2024-10-19 PROCEDURE — 81001 URINALYSIS AUTO W/SCOPE: CPT | Performed by: INTERNAL MEDICINE

## 2024-10-19 PROCEDURE — 2500000004 HC RX 250 GENERAL PHARMACY W/ HCPCS (ALT 636 FOR OP/ED): Performed by: NURSE PRACTITIONER

## 2024-10-19 RX ADMIN — SODIUM CHLORIDE, POTASSIUM CHLORIDE, SODIUM LACTATE AND CALCIUM CHLORIDE 1000 ML: 600; 310; 30; 20 INJECTION, SOLUTION INTRAVENOUS at 23:22

## 2024-10-19 ASSESSMENT — PAIN DESCRIPTION - PAIN TYPE: TYPE: ACUTE PAIN

## 2024-10-19 ASSESSMENT — PAIN DESCRIPTION - LOCATION: LOCATION: ABDOMEN

## 2024-10-19 ASSESSMENT — PAIN - FUNCTIONAL ASSESSMENT: PAIN_FUNCTIONAL_ASSESSMENT: 0-10

## 2024-10-19 ASSESSMENT — PAIN SCALES - GENERAL: PAINLEVEL_OUTOF10: 7

## 2024-10-20 ENCOUNTER — APPOINTMENT (OUTPATIENT)
Dept: CARDIOLOGY | Facility: HOSPITAL | Age: 44
End: 2024-10-20
Payer: COMMERCIAL

## 2024-10-20 ENCOUNTER — APPOINTMENT (OUTPATIENT)
Dept: RADIOLOGY | Facility: HOSPITAL | Age: 44
End: 2024-10-20
Payer: COMMERCIAL

## 2024-10-20 VITALS
WEIGHT: 293 LBS | HEART RATE: 95 BPM | DIASTOLIC BLOOD PRESSURE: 71 MMHG | TEMPERATURE: 97 F | SYSTOLIC BLOOD PRESSURE: 124 MMHG | OXYGEN SATURATION: 95 % | RESPIRATION RATE: 18 BRPM | HEIGHT: 68 IN | BODY MASS INDEX: 44.41 KG/M2

## 2024-10-20 PROBLEM — A41.9 SEPSIS (MULTI): Status: ACTIVE | Noted: 2024-10-20

## 2024-10-20 LAB
BACTERIA BLD CULT: NORMAL
BASOPHILS # BLD AUTO: 0.05 X10*3/UL (ref 0–0.1)
BASOPHILS NFR BLD AUTO: 0.5 %
CLUE CELLS SPEC QL WET PREP: NORMAL
EOSINOPHIL # BLD AUTO: 0.03 X10*3/UL (ref 0–0.7)
EOSINOPHIL NFR BLD AUTO: 0.3 %
ERYTHROCYTE [DISTWIDTH] IN BLOOD BY AUTOMATED COUNT: 12.6 % (ref 11.5–14.5)
GRAM STN SPEC: ABNORMAL
HCG UR QL IA.RAPID: NEGATIVE
HCT VFR BLD AUTO: 44.7 % (ref 36–46)
HGB BLD-MCNC: 14.9 G/DL (ref 12–16)
IMM GRANULOCYTES # BLD AUTO: 0.04 X10*3/UL (ref 0–0.7)
IMM GRANULOCYTES NFR BLD AUTO: 0.4 % (ref 0–0.9)
LACTATE SERPL-SCNC: 1.3 MMOL/L (ref 0.4–2)
LACTATE SERPL-SCNC: 2.4 MMOL/L (ref 0.4–2)
LYMPHOCYTES # BLD AUTO: 1.03 X10*3/UL (ref 1.2–4.8)
LYMPHOCYTES NFR BLD AUTO: 10 %
MCH RBC QN AUTO: 30.8 PG (ref 26–34)
MCHC RBC AUTO-ENTMCNC: 33.3 G/DL (ref 32–36)
MCV RBC AUTO: 93 FL (ref 80–100)
MONOCYTES # BLD AUTO: 1.07 X10*3/UL (ref 0.1–1)
MONOCYTES NFR BLD AUTO: 10.4 %
NEUTROPHILS # BLD AUTO: 8.04 X10*3/UL (ref 1.2–7.7)
NEUTROPHILS NFR BLD AUTO: 78.4 %
NRBC BLD-RTO: 0 /100 WBCS (ref 0–0)
PLATELET # BLD AUTO: 246 X10*3/UL (ref 150–450)
RBC # BLD AUTO: 4.83 X10*6/UL (ref 4–5.2)
T VAGINALIS SPEC QL WET PREP: NORMAL
TRICHOMONAS REFLEX COMMENT: NORMAL
WBC # BLD AUTO: 10.3 X10*3/UL (ref 4.4–11.3)
WBC VAG QL WET PREP: NORMAL
YEAST VAG QL WET PREP: NORMAL

## 2024-10-20 PROCEDURE — 87077 CULTURE AEROBIC IDENTIFY: CPT | Mod: AHULAB | Performed by: NURSE PRACTITIONER

## 2024-10-20 PROCEDURE — 96372 THER/PROPH/DIAG INJ SC/IM: CPT | Performed by: INTERNAL MEDICINE

## 2024-10-20 PROCEDURE — 2500000001 HC RX 250 WO HCPCS SELF ADMINISTERED DRUGS (ALT 637 FOR MEDICARE OP): Performed by: INTERNAL MEDICINE

## 2024-10-20 PROCEDURE — 99222 1ST HOSP IP/OBS MODERATE 55: CPT | Performed by: NURSE PRACTITIONER

## 2024-10-20 PROCEDURE — 2500000004 HC RX 250 GENERAL PHARMACY W/ HCPCS (ALT 636 FOR OP/ED): Performed by: INTERNAL MEDICINE

## 2024-10-20 PROCEDURE — G0378 HOSPITAL OBSERVATION PER HR: HCPCS

## 2024-10-20 PROCEDURE — 87210 SMEAR WET MOUNT SALINE/INK: CPT | Performed by: NURSE PRACTITIONER

## 2024-10-20 PROCEDURE — 74176 CT ABD & PELVIS W/O CONTRAST: CPT | Performed by: STUDENT IN AN ORGANIZED HEALTH CARE EDUCATION/TRAINING PROGRAM

## 2024-10-20 PROCEDURE — 74176 CT ABD & PELVIS W/O CONTRAST: CPT

## 2024-10-20 PROCEDURE — 36415 COLL VENOUS BLD VENIPUNCTURE: CPT | Performed by: NURSE PRACTITIONER

## 2024-10-20 PROCEDURE — 2500000004 HC RX 250 GENERAL PHARMACY W/ HCPCS (ALT 636 FOR OP/ED): Performed by: NURSE PRACTITIONER

## 2024-10-20 PROCEDURE — 87491 CHLMYD TRACH DNA AMP PROBE: CPT | Mod: AHULAB | Performed by: NURSE PRACTITIONER

## 2024-10-20 PROCEDURE — 96375 TX/PRO/DX INJ NEW DRUG ADDON: CPT | Mod: 59

## 2024-10-20 PROCEDURE — 93005 ELECTROCARDIOGRAM TRACING: CPT

## 2024-10-20 PROCEDURE — 83605 ASSAY OF LACTIC ACID: CPT | Performed by: NURSE PRACTITIONER

## 2024-10-20 PROCEDURE — 96365 THER/PROPH/DIAG IV INF INIT: CPT | Mod: 59

## 2024-10-20 PROCEDURE — 87661 TRICHOMONAS VAGINALIS AMPLIF: CPT | Mod: AHULAB | Performed by: NURSE PRACTITIONER

## 2024-10-20 PROCEDURE — 96361 HYDRATE IV INFUSION ADD-ON: CPT | Mod: 59

## 2024-10-20 PROCEDURE — 2500000001 HC RX 250 WO HCPCS SELF ADMINISTERED DRUGS (ALT 637 FOR MEDICARE OP): Performed by: NURSE PRACTITIONER

## 2024-10-20 PROCEDURE — 2500000002 HC RX 250 W HCPCS SELF ADMINISTERED DRUGS (ALT 637 FOR MEDICARE OP, ALT 636 FOR OP/ED): Performed by: NURSE PRACTITIONER

## 2024-10-20 PROCEDURE — 96376 TX/PRO/DX INJ SAME DRUG ADON: CPT | Mod: 59

## 2024-10-20 RX ORDER — POTASSIUM CHLORIDE 20 MEQ/1
40 TABLET, EXTENDED RELEASE ORAL ONCE
Status: COMPLETED | OUTPATIENT
Start: 2024-10-20 | End: 2024-10-20

## 2024-10-20 RX ORDER — ONDANSETRON HYDROCHLORIDE 2 MG/ML
4 INJECTION, SOLUTION INTRAVENOUS ONCE
Status: COMPLETED | OUTPATIENT
Start: 2024-10-20 | End: 2024-10-20

## 2024-10-20 RX ORDER — CEFTRIAXONE 1 G/50ML
1 INJECTION, SOLUTION INTRAVENOUS EVERY 24 HOURS
Status: DISCONTINUED | OUTPATIENT
Start: 2024-10-21 | End: 2024-10-20

## 2024-10-20 RX ORDER — KETOROLAC TROMETHAMINE 30 MG/ML
30 INJECTION, SOLUTION INTRAMUSCULAR; INTRAVENOUS EVERY 6 HOURS PRN
Status: DISCONTINUED | OUTPATIENT
Start: 2024-10-20 | End: 2024-10-21 | Stop reason: HOSPADM

## 2024-10-20 RX ORDER — ACETAMINOPHEN 650 MG/1
650 SUPPOSITORY RECTAL EVERY 4 HOURS PRN
Status: DISCONTINUED | OUTPATIENT
Start: 2024-10-20 | End: 2024-10-21

## 2024-10-20 RX ORDER — PANTOPRAZOLE SODIUM 40 MG/1
40 TABLET, DELAYED RELEASE ORAL
Status: DISCONTINUED | OUTPATIENT
Start: 2024-10-20 | End: 2024-10-21 | Stop reason: HOSPADM

## 2024-10-20 RX ORDER — ACETAMINOPHEN 325 MG/1
650 TABLET ORAL EVERY 4 HOURS PRN
Status: DISCONTINUED | OUTPATIENT
Start: 2024-10-20 | End: 2024-10-21

## 2024-10-20 RX ORDER — ATORVASTATIN CALCIUM 20 MG/1
20 TABLET, FILM COATED ORAL DAILY
Status: DISCONTINUED | OUTPATIENT
Start: 2024-10-20 | End: 2024-10-21 | Stop reason: HOSPADM

## 2024-10-20 RX ORDER — METOPROLOL SUCCINATE 50 MG/1
50 TABLET, EXTENDED RELEASE ORAL DAILY
Status: DISCONTINUED | OUTPATIENT
Start: 2024-10-20 | End: 2024-10-21 | Stop reason: HOSPADM

## 2024-10-20 RX ORDER — ENOXAPARIN SODIUM 100 MG/ML
40 INJECTION SUBCUTANEOUS EVERY 12 HOURS SCHEDULED
Status: DISCONTINUED | OUTPATIENT
Start: 2024-10-20 | End: 2024-10-21 | Stop reason: HOSPADM

## 2024-10-20 RX ORDER — KETOROLAC TROMETHAMINE 30 MG/ML
15 INJECTION, SOLUTION INTRAMUSCULAR; INTRAVENOUS ONCE
Status: COMPLETED | OUTPATIENT
Start: 2024-10-20 | End: 2024-10-20

## 2024-10-20 RX ORDER — POLYETHYLENE GLYCOL 3350 17 G/17G
17 POWDER, FOR SOLUTION ORAL DAILY PRN
Status: DISCONTINUED | OUTPATIENT
Start: 2024-10-20 | End: 2024-10-21 | Stop reason: HOSPADM

## 2024-10-20 RX ORDER — ASPIRIN 81 MG/1
81 TABLET ORAL DAILY
Status: DISCONTINUED | OUTPATIENT
Start: 2024-10-20 | End: 2024-10-21 | Stop reason: HOSPADM

## 2024-10-20 RX ORDER — ACETAMINOPHEN 325 MG/1
975 TABLET ORAL ONCE
Status: COMPLETED | OUTPATIENT
Start: 2024-10-20 | End: 2024-10-20

## 2024-10-20 RX ORDER — PANTOPRAZOLE SODIUM 40 MG/10ML
40 INJECTION, POWDER, LYOPHILIZED, FOR SOLUTION INTRAVENOUS
Status: DISCONTINUED | OUTPATIENT
Start: 2024-10-20 | End: 2024-10-21 | Stop reason: HOSPADM

## 2024-10-20 RX ORDER — AMLODIPINE BESYLATE 5 MG/1
5 TABLET ORAL DAILY
Status: DISCONTINUED | OUTPATIENT
Start: 2024-10-20 | End: 2024-10-21 | Stop reason: HOSPADM

## 2024-10-20 RX ORDER — ONDANSETRON 4 MG/1
4 TABLET, FILM COATED ORAL EVERY 8 HOURS PRN
Status: DISCONTINUED | OUTPATIENT
Start: 2024-10-20 | End: 2024-10-21 | Stop reason: HOSPADM

## 2024-10-20 RX ORDER — ACETAMINOPHEN 160 MG/5ML
650 SOLUTION ORAL EVERY 4 HOURS PRN
Status: DISCONTINUED | OUTPATIENT
Start: 2024-10-20 | End: 2024-10-21

## 2024-10-20 RX ORDER — ONDANSETRON HYDROCHLORIDE 2 MG/ML
4 INJECTION, SOLUTION INTRAVENOUS EVERY 8 HOURS PRN
Status: DISCONTINUED | OUTPATIENT
Start: 2024-10-20 | End: 2024-10-21 | Stop reason: HOSPADM

## 2024-10-20 RX ADMIN — PANTOPRAZOLE SODIUM 40 MG: 40 TABLET, DELAYED RELEASE ORAL at 09:40

## 2024-10-20 RX ADMIN — METOPROLOL SUCCINATE 50 MG: 50 TABLET, EXTENDED RELEASE ORAL at 09:41

## 2024-10-20 RX ADMIN — ONDANSETRON 4 MG: 2 INJECTION, SOLUTION INTRAMUSCULAR; INTRAVENOUS at 02:40

## 2024-10-20 RX ADMIN — ACETAMINOPHEN 325MG 650 MG: 325 TABLET ORAL at 15:14

## 2024-10-20 RX ADMIN — SODIUM CHLORIDE, POTASSIUM CHLORIDE, SODIUM LACTATE AND CALCIUM CHLORIDE 1000 ML: 600; 310; 30; 20 INJECTION, SOLUTION INTRAVENOUS at 02:14

## 2024-10-20 RX ADMIN — POTASSIUM CHLORIDE 40 MEQ: 1500 TABLET, EXTENDED RELEASE ORAL at 02:41

## 2024-10-20 RX ADMIN — ACETAMINOPHEN 325MG 650 MG: 325 TABLET ORAL at 09:47

## 2024-10-20 RX ADMIN — AMLODIPINE BESYLATE 5 MG: 5 TABLET ORAL at 09:41

## 2024-10-20 RX ADMIN — ACETAMINOPHEN 975 MG: 325 TABLET, FILM COATED ORAL at 01:09

## 2024-10-20 RX ADMIN — ENOXAPARIN SODIUM 40 MG: 40 INJECTION SUBCUTANEOUS at 20:31

## 2024-10-20 RX ADMIN — ATORVASTATIN CALCIUM 20 MG: 20 TABLET, FILM COATED ORAL at 09:41

## 2024-10-20 RX ADMIN — KETOROLAC TROMETHAMINE 30 MG: 30 INJECTION, SOLUTION INTRAMUSCULAR at 15:51

## 2024-10-20 RX ADMIN — ASPIRIN 81 MG: 81 TABLET, COATED ORAL at 09:41

## 2024-10-20 RX ADMIN — CEFTRIAXONE 2 G: 2 INJECTION, POWDER, FOR SOLUTION INTRAMUSCULAR; INTRAVENOUS at 01:10

## 2024-10-20 RX ADMIN — ENOXAPARIN SODIUM 40 MG: 40 INJECTION SUBCUTANEOUS at 09:42

## 2024-10-20 RX ADMIN — KETOROLAC TROMETHAMINE 15 MG: 30 INJECTION, SOLUTION INTRAMUSCULAR at 02:40

## 2024-10-20 SDOH — SOCIAL STABILITY: SOCIAL INSECURITY: WITHIN THE LAST YEAR, HAVE YOU BEEN AFRAID OF YOUR PARTNER OR EX-PARTNER?: NO

## 2024-10-20 SDOH — ECONOMIC STABILITY: FOOD INSECURITY: WITHIN THE PAST 12 MONTHS, THE FOOD YOU BOUGHT JUST DIDN'T LAST AND YOU DIDN'T HAVE MONEY TO GET MORE.: NEVER TRUE

## 2024-10-20 SDOH — SOCIAL STABILITY: SOCIAL INSECURITY: ARE THERE ANY APPARENT SIGNS OF INJURIES/BEHAVIORS THAT COULD BE RELATED TO ABUSE/NEGLECT?: NO

## 2024-10-20 SDOH — ECONOMIC STABILITY: INCOME INSECURITY: IN THE PAST 12 MONTHS HAS THE ELECTRIC, GAS, OIL, OR WATER COMPANY THREATENED TO SHUT OFF SERVICES IN YOUR HOME?: NO

## 2024-10-20 SDOH — SOCIAL STABILITY: SOCIAL INSECURITY
WITHIN THE LAST YEAR, HAVE YOU BEEN KICKED, HIT, SLAPPED, OR OTHERWISE PHYSICALLY HURT BY YOUR PARTNER OR EX-PARTNER?: NO

## 2024-10-20 SDOH — ECONOMIC STABILITY: FOOD INSECURITY: HOW HARD IS IT FOR YOU TO PAY FOR THE VERY BASICS LIKE FOOD, HOUSING, MEDICAL CARE, AND HEATING?: NOT VERY HARD

## 2024-10-20 SDOH — ECONOMIC STABILITY: FOOD INSECURITY: WITHIN THE PAST 12 MONTHS, YOU WORRIED THAT YOUR FOOD WOULD RUN OUT BEFORE YOU GOT THE MONEY TO BUY MORE.: NEVER TRUE

## 2024-10-20 SDOH — SOCIAL STABILITY: SOCIAL INSECURITY: WITHIN THE LAST YEAR, HAVE YOU BEEN HUMILIATED OR EMOTIONALLY ABUSED IN OTHER WAYS BY YOUR PARTNER OR EX-PARTNER?: NO

## 2024-10-20 SDOH — SOCIAL STABILITY: SOCIAL INSECURITY
WITHIN THE LAST YEAR, HAVE YOU BEEN RAPED OR FORCED TO HAVE ANY KIND OF SEXUAL ACTIVITY BY YOUR PARTNER OR EX-PARTNER?: NO

## 2024-10-20 SDOH — ECONOMIC STABILITY: HOUSING INSECURITY: IN THE LAST 12 MONTHS, WAS THERE A TIME WHEN YOU WERE NOT ABLE TO PAY THE MORTGAGE OR RENT ON TIME?: NO

## 2024-10-20 SDOH — SOCIAL STABILITY: SOCIAL INSECURITY: ABUSE: ADULT

## 2024-10-20 SDOH — SOCIAL STABILITY: SOCIAL INSECURITY: WERE YOU ABLE TO COMPLETE ALL THE BEHAVIORAL HEALTH SCREENINGS?: YES

## 2024-10-20 SDOH — ECONOMIC STABILITY: TRANSPORTATION INSECURITY: IN THE PAST 12 MONTHS, HAS LACK OF TRANSPORTATION KEPT YOU FROM MEDICAL APPOINTMENTS OR FROM GETTING MEDICATIONS?: NO

## 2024-10-20 SDOH — SOCIAL STABILITY: SOCIAL INSECURITY: DO YOU FEEL ANYONE HAS EXPLOITED OR TAKEN ADVANTAGE OF YOU FINANCIALLY OR OF YOUR PERSONAL PROPERTY?: NO

## 2024-10-20 SDOH — SOCIAL STABILITY: SOCIAL INSECURITY: DO YOU FEEL UNSAFE GOING BACK TO THE PLACE WHERE YOU ARE LIVING?: NO

## 2024-10-20 SDOH — ECONOMIC STABILITY: HOUSING INSECURITY: AT ANY TIME IN THE PAST 12 MONTHS, WERE YOU HOMELESS OR LIVING IN A SHELTER (INCLUDING NOW)?: NO

## 2024-10-20 SDOH — SOCIAL STABILITY: SOCIAL INSECURITY: HAS ANYONE EVER THREATENED TO HURT YOUR FAMILY OR YOUR PETS?: NO

## 2024-10-20 SDOH — SOCIAL STABILITY: SOCIAL INSECURITY: HAVE YOU HAD ANY THOUGHTS OF HARMING ANYONE ELSE?: NO

## 2024-10-20 SDOH — SOCIAL STABILITY: SOCIAL INSECURITY: HAVE YOU HAD THOUGHTS OF HARMING ANYONE ELSE?: NO

## 2024-10-20 SDOH — ECONOMIC STABILITY: HOUSING INSECURITY: IN THE PAST 12 MONTHS, HOW MANY TIMES HAVE YOU MOVED WHERE YOU WERE LIVING?: 0

## 2024-10-20 SDOH — SOCIAL STABILITY: SOCIAL INSECURITY: DOES ANYONE TRY TO KEEP YOU FROM HAVING/CONTACTING OTHER FRIENDS OR DOING THINGS OUTSIDE YOUR HOME?: NO

## 2024-10-20 SDOH — SOCIAL STABILITY: SOCIAL INSECURITY: ARE YOU OR HAVE YOU BEEN THREATENED OR ABUSED PHYSICALLY, EMOTIONALLY, OR SEXUALLY BY ANYONE?: NO

## 2024-10-20 ASSESSMENT — ENCOUNTER SYMPTOMS
CHILLS: 1
DYSURIA: 1
RECTAL PAIN: 0
FREQUENCY: 0
DIFFICULTY URINATING: 0
HEMATURIA: 1
FEVER: 1
FLANK PAIN: 1
VOMITING: 0
NAUSEA: 1
ARTHRALGIAS: 1
FATIGUE: 1

## 2024-10-20 ASSESSMENT — COGNITIVE AND FUNCTIONAL STATUS - GENERAL
PATIENT BASELINE BEDBOUND: NO
DAILY ACTIVITIY SCORE: 24
MOBILITY SCORE: 24

## 2024-10-20 ASSESSMENT — PATIENT HEALTH QUESTIONNAIRE - PHQ9
1. LITTLE INTEREST OR PLEASURE IN DOING THINGS: NOT AT ALL
2. FEELING DOWN, DEPRESSED OR HOPELESS: NOT AT ALL
SUM OF ALL RESPONSES TO PHQ9 QUESTIONS 1 & 2: 0

## 2024-10-20 ASSESSMENT — PAIN DESCRIPTION - LOCATION
LOCATION: HEAD
LOCATION: HEAD

## 2024-10-20 ASSESSMENT — ACTIVITIES OF DAILY LIVING (ADL)
PATIENT'S MEMORY ADEQUATE TO SAFELY COMPLETE DAILY ACTIVITIES?: YES
BATHING: INDEPENDENT
HEARING - LEFT EAR: FUNCTIONAL
FEEDING YOURSELF: INDEPENDENT
DRESSING YOURSELF: INDEPENDENT
ADEQUATE_TO_COMPLETE_ADL: YES
LACK_OF_TRANSPORTATION: NO
LACK_OF_TRANSPORTATION: NO
WALKS IN HOME: INDEPENDENT
GROOMING: INDEPENDENT
JUDGMENT_ADEQUATE_SAFELY_COMPLETE_DAILY_ACTIVITIES: YES
HEARING - RIGHT EAR: FUNCTIONAL
TOILETING: INDEPENDENT

## 2024-10-20 ASSESSMENT — LIFESTYLE VARIABLES
HOW OFTEN DO YOU HAVE 6 OR MORE DRINKS ON ONE OCCASION: NEVER
SKIP TO QUESTIONS 9-10: 1
HOW MANY STANDARD DRINKS CONTAINING ALCOHOL DO YOU HAVE ON A TYPICAL DAY: 1 OR 2
AUDIT-C TOTAL SCORE: 2
HOW OFTEN DO YOU HAVE A DRINK CONTAINING ALCOHOL: 2-4 TIMES A MONTH
AUDIT-C TOTAL SCORE: 2

## 2024-10-20 ASSESSMENT — PAIN SCALES - GENERAL
PAINLEVEL_OUTOF10: 5 - MODERATE PAIN
PAINLEVEL_OUTOF10: 0 - NO PAIN
PAINLEVEL_OUTOF10: 0 - NO PAIN

## 2024-10-20 ASSESSMENT — PAIN - FUNCTIONAL ASSESSMENT
PAIN_FUNCTIONAL_ASSESSMENT: 0-10
PAIN_FUNCTIONAL_ASSESSMENT: 0-10

## 2024-10-20 ASSESSMENT — PAIN DESCRIPTION - ORIENTATION: ORIENTATION: LOWER

## 2024-10-20 NOTE — PROGRESS NOTES
10/20/24 1017   Discharge Planning   Living Arrangements Children   Support Systems Children   Assistance Needed met with patient and daughter in room. PTA, was independent wtih all care and ambulation, no hhc services. NEeds pcp, provided with  pcp list   Type of Residence Private residence   Home or Post Acute Services None   Expected Discharge Disposition Home   Does the patient need discharge transport arranged? No   Financial Resource Strain   How hard is it for you to pay for the very basics like food, housing, medical care, and heating? Not very   Housing Stability   In the last 12 months, was there a time when you were not able to pay the mortgage or rent on time? N   In the past 12 months, how many times have you moved where you were living? 0   At any time in the past 12 months, were you homeless or living in a shelter (including now)? N   Transportation Needs   In the past 12 months, has lack of transportation kept you from medical appointments or from getting medications? no   In the past 12 months, has lack of transportation kept you from meetings, work, or from getting things needed for daily living? No     Flaquito GOLDEN Merlin is a 44 y.o. female on day 0 of admission presenting with Sepsis (Multi).    Karon Brito RN

## 2024-10-20 NOTE — ED PROVIDER NOTES
HPI     CC: Flu Symptoms     HPI: Chalise N Merlin is a 44 y.o. female with past medical history of hypertension, UTIs, GERD, hyperlipidemia, Chen, obstructive sleep apnea presents with complaints of fever that started on Friday.  She endorses associated pressure with urination, chills, nausea, back pain, and body aches.  Patient states upon awakening on Friday morning she felt like she had been hit by a truck.  She denies cough, congestion, or shortness of breath, or palpitations.  She reports sick contacts with viral syndrome.  She reports she had a UTI a few months ago treated with antibiotics and resolved.  She reports being sexually active having unprotected sex.  Denies dysuria, hematuria, frequency or urgency.  Denies vaginal discharge, or foul odor.     ROS: 10-point review of systems was performed and is otherwise negative except as noted in HPI.      Past Medical History: Noncontributory except per HPI     Past Surgical History: Noncontributory except per HPI     Family History: Reviewed and noncontributory     Social History:  Noncontributory except per HPI       Allergies   Allergen Reactions    Seasonale (91) [Levonorgestrel-Ethinyl Estrad] Itching     Headaches, watery eyes with congestion that develops into sinus infection.       No past medical history on file.    Home Meds:   Current Outpatient Medications   Medication Instructions    amLODIPine (NORVASC) 5 mg, oral, Daily    aspirin 81 mg, oral, Daily    atorvastatin (LIPITOR) 20 mg, oral, Daily    metoprolol succinate XL (TOPROL-XL) 50 mg, oral, Daily        ED Triage Vitals [10/19/24 2125]   Temperature Heart Rate Respirations BP   37.9 °C (100.2 °F) (!) 105 20 (!) 157/113      Pulse Ox Temp Source Heart Rate Source Patient Position   100 % Oral Monitor --      BP Location FiO2 (%)     -- --         Heart Rate:  [105]   Temperature:  [37.9 °C (100.2 °F)]   Respirations:  [20]   BP: (157)/(113)   Weight:  [145 kg (320 lb)]   Pulse Ox:  [100 %]       Physical Exam:  Physical Exam  Vitals and nursing note reviewed.   Constitutional:       General: She is not in acute distress.     Appearance: She is well-developed. She is obese. She is ill-appearing.   HENT:      Head: Normocephalic and atraumatic.   Eyes:      Conjunctiva/sclera: Conjunctivae normal.   Cardiovascular:      Rate and Rhythm: Regular rhythm. Tachycardia present.      Pulses: Normal pulses.      Heart sounds: No murmur heard.  Pulmonary:      Effort: Pulmonary effort is normal. No respiratory distress.      Breath sounds: Normal breath sounds.   Abdominal:      General: Bowel sounds are normal.      Palpations: Abdomen is soft. There is no splenomegaly or mass.      Tenderness: There is no abdominal tenderness. There is right CVA tenderness. There is no left CVA tenderness, guarding or rebound. Negative signs include Jain's sign and McBurney's sign.   Musculoskeletal:         General: No swelling.      Cervical back: Neck supple.   Skin:     General: Skin is warm and dry.      Capillary Refill: Capillary refill takes less than 2 seconds.   Neurological:      Mental Status: She is alert and oriented to person, place, and time.   Psychiatric:         Mood and Affect: Mood normal.          Diagnostic Results        Labs Reviewed   URINALYSIS WITH REFLEX MICROSCOPIC - Abnormal       Result Value    Color, Urine Yellow      Appearance, Urine Ex.Turbid (*)     Specific Gravity, Urine 1.012      pH, Urine 6.5      Protein, Urine 200 (2+) (*)     Glucose, Urine Normal      Blood, Urine 1.0 (3+) (*)     Ketones, Urine NEGATIVE      Bilirubin, Urine NEGATIVE      Urobilinogen, Urine Normal      Nitrite, Urine NEGATIVE      Leukocyte Esterase, Urine 500 Chavo/µL (*)    MICROSCOPIC ONLY, URINE - Abnormal    WBC, Urine >50 (*)     WBC Clumps, Urine MANY      RBC, Urine >20 (*)     Squamous Epithelial Cells, Urine 10-25 (FEW)      Mucus, Urine FEW     CBC WITH AUTO DIFFERENTIAL - Abnormal    WBC 10.3       nRBC 0.0      RBC 4.83      Hemoglobin 14.9      Hematocrit 44.7      MCV 93      MCH 30.8      MCHC 33.3      RDW 12.6      Platelets 246      Neutrophils % 78.4      Immature Granulocytes %, Automated 0.4      Lymphocytes % 10.0      Monocytes % 10.4      Eosinophils % 0.3      Basophils % 0.5      Neutrophils Absolute 8.04 (*)     Immature Granulocytes Absolute, Automated 0.04      Lymphocytes Absolute 1.03 (*)     Monocytes Absolute 1.07 (*)     Eosinophils Absolute 0.03      Basophils Absolute 0.05     COMPREHENSIVE METABOLIC PANEL - Abnormal    Glucose 143 (*)     Sodium 134 (*)     Potassium 3.4 (*)     Chloride 98      Bicarbonate 26      Anion Gap 13      Urea Nitrogen 6      Creatinine 0.88      eGFR 82      Calcium 9.1      Albumin 3.8      Alkaline Phosphatase 80      Total Protein 7.3      AST 19      Bilirubin, Total 1.0      ALT 32     SARS-COV-2 PCR - Normal    Coronavirus 2019, PCR Not Detected      Narrative:     This assay has received FDA Emergency Use Authorization (EUA) and is only authorized for the duration of time that circumstances exist to justify the authorization of the emergency use of in vitro diagnostic tests for the detection of SARS-CoV-2 virus and/or diagnosis of COVID-19 infection under section 564(b)(1) of the Act, 21 U.S.C. 360bbb-3(b)(1). This assay is an in vitro diagnostic nucleic acid amplification test for the qualitative detection of SARS-CoV-2 from nasopharyngeal specimens and has been validated for use at [facility]. Negative results do not preclude COVID-19 infections and should not be used as the sole basis for diagnosis, treatment, or other management decisions.     INFLUENZA A AND B PCR - Normal    Flu A Result Not Detected      Flu B Result Not Detected      Narrative:     This assay is an in vitro diagnostic multiplex nucleic acid amplification test for the detection and discrimination of Influenza A & B from nasopharyngeal specimens, and  has been validated for use at [facility]. Negative results do not preclude Influenza A/B infections, and should not be used as the sole basis for diagnosis, treatment, or other management decisions. If Influenza A/B and RSV PCR results are negative, testing for Parainfluenza virus, Adenovirus and Metapneumovirus is routinely performed for [facility] pediatric oncology and intensive care inpatients, and is available on other patients by placing an add-on request.   URINE CULTURE   BLOOD CULTURE   BLOOD CULTURE   BLOOD CULTURE   HCG, URINE, QUALITATIVE   WET PREP, GENITAL   C. TRACHOMATIS + N. GONORRHOEAE, AMPLIFIED   TRICHOMONAS WET PREP REFLEX TO PCR   LACTATE         XR chest 1 view   Final Result   No evidence of acute intrathoracic abnormality.        Signed by: Oren Encinas 10/19/2024 10:56 PM   Dictation workstation:   [id number]                    Gisella Coma Scale Score: 15                  Procedure  Procedures    ED Course & MDM   Assessment/Plan:     Medications   cefTRIAXone (Rocephin) in dextrose 5% IV 2 g (has no administration in time range)   acetaminophen (Tylenol) tablet 975 mg (has no administration in time range)   lactated Ringer's bolus 1,000 mL (1,000 mL intravenous New Bag 10/19/24 3052)        ED Course as of 10/20/24 2025   Sun Oct 20, 2024   0104 I personally reviewed all lab results and discussed with the patient. Positive UTI  [FREDDY]   0146 Lactate(!): 2.4 [FREDDY]      ED Course User Index  [FREDDY] Lilli Hahn V, APRN-CNP         Diagnoses as of 10/20/24 2025   SIRS (systemic inflammatory response syndrome) (Multi)   Urinary tract infection with hematuria, site unspecified       Medical Decision Making    Chalise N Merlin is a 44 y.o. female with past medical history of hypertension, UTIs, GERD, hyperlipidemia, Chen, obstructive sleep apnea presents with complaints of fever, pressure with urination, chills, nausea, back pain, and body aches that started on Friday morning. She  denies cough, congestion, or shortness of breath, or palpitations.  Sick contacts in the home with viral syndrome.  She reports UTI a few months ago treated with antibiotics and resolved.  She reports being sexually active having unprotected sex.  Denies dysuria, hematuria, frequency or urgency.  Denies vaginal discharge, or foul odor.  Differential diagnosis UTI vs urosepsis vs STIs vs nephrolithiasis.  On exam she is alert and oriented x 3, ill-appearing, febrile 100.2, tachycardic 105, and hypertensive 157/113.  Viral swabs negative.  Urine positive for blood and leukocytosis will treat for UTI.  Will include STI swab as patient reports having unprotected sex.  Lactate pending results.  White blood count 10.3 which is reassuring.  Potassium 3.4 will treat with potassium chloride 40 meq PO kidney function within normal limits.    STI panel sent and pending results.    Given fever, tachycardia, positive UTI patient is given IV fluid bolus, and IV Rocephin 2 g.     I discussed this patient with Dr. Holly who also evaluated this patient.    Patient will need observation for urosepsis with IV antibiotics.     Re-evaluation of patient states she feels better, chills and pain has improved.    I spoke with Dr. Clark who accepted the patient for observation. Patient care transferred in stable condition. Repeat Lactic 1.3 Repeat vitals order awaiting documentation by nursing staff. Revaluation of patient reports improvement in pain, and chills have resolved    CT abdomen and pelvis ordered.              Disposition: Observation    ED Prescriptions    None         Social Determinants Affecting Care: none     CALLY Pryor    This note was dictated by speech recognition. Minor errors in transcription may be present.     CALLY Matos  10/20/24 0537       CALLY Matos  10/20/24 2026

## 2024-10-20 NOTE — H&P
History Of Present Illness  Chalise N Merlin is a 44 y.o. female with PMH of PE (2014, was on AC for 1 year), HTN. HLD, WHITFIELD, who presented with fever/chills, bodyaches, nausea, and bilateral lower back pain x 2 days.  She also has lower abdominal pressure during and after urination.  Her symptoms all started suddenly on Friday; she did not have any urinary symptoms prior to this.  She did note some blood tinged in her urine last night.  She reports having a UTI in the past, but no problem with frequent or recurrent.  In ED, UA was positive for UTI.  A CT A/P showed bladder wall thickening, and left greater than right perinephric stranding and renal parenchymal edema suggestive of acute pyelonephritis.  There was no nephrolithiasis or hydroureteronephrosis.  She had no leukocytosis with WBC at 10.3, labs unremarkable aside from mild hyponatremia 134, hypokalemia 3.4. She had low grade temperature of 100.2, tachycardia to 105.  Initial blood pressure was elevated but rechecks have stabilized. Initial lactate elevated at 2.4, improved to 1.3     Past Medical History  No past medical history on file.    Surgical History  Past Surgical History:   Procedure Laterality Date    OTHER SURGICAL HISTORY  12/03/2018    Dilation and curettage    OTHER SURGICAL HISTORY  12/03/2018    Hysteroscopy        Social History  She reports that she has never smoked. She has never used smokeless tobacco. She reports current alcohol use. She reports that she does not use drugs.    Family History  Family History   Family history unknown: Yes        Allergies  Seasonale (91) [levonorgestrel-ethinyl estrad]    Review of Systems   Constitutional:  Positive for chills, fatigue and fever.   Gastrointestinal:  Positive for nausea. Negative for rectal pain and vomiting.   Genitourinary:  Positive for dysuria, flank pain and hematuria. Negative for decreased urine volume, difficulty urinating, dyspareunia, frequency, urgency, vaginal bleeding,  "vaginal discharge and vaginal pain.   Musculoskeletal:  Positive for arthralgias.        Physical Exam     Constitutional: NAD, appears tired/fatigued, pt alert and cooperative, morbidly obese  Eyes: no icterus  ENMT: mucous membranes moist, no lesions seen  Head/Neck: Neck supple  Respiratory/Thorax: CTA bilaterally, non-labored breathing, no cough, on RA  Cardiovascular: Regular rate and rhythm, no murmurs heard  Gastrointestinal: Nondistended, soft, non-tender, BS present x 4, + B/L flank pain   : no Allen, no SP discomfort  Musculoskeletal: ROM intact, no joint swelling  Extremities: normal extremities, no edema  Neurological: A&O x 3, speech clear, follows commands appropriately, cr. n. grossly intact, sensation grossly intact, motor 5/5 throughout  Skin: Warm and dry, no lesions, no rashes  Psych: calm, stable mood    Last Recorded Vitals  Blood pressure 126/68, pulse 85, temperature 35.7 °C (96.3 °F), temperature source Temporal, resp. rate 20, height 1.727 m (5' 7.99\"), weight 145 kg (319 lb 10.7 oz), SpO2 96%, not currently breastfeeding.    Relevant Results        Scheduled medications  amLODIPine, 5 mg, oral, Daily  aspirin, 81 mg, oral, Daily  atorvastatin, 20 mg, oral, Daily  [START ON 10/21/2024] cefTRIAXone, 2 g, intravenous, q24h  enoxaparin, 40 mg, subcutaneous, q12h YOLANDA  metoprolol succinate XL, 50 mg, oral, Daily  pantoprazole, 40 mg, oral, Daily before breakfast   Or  pantoprazole, 40 mg, intravenous, Daily before breakfast  perflutren lipid microspheres, 0.5-10 mL of dilution, intravenous, Once in imaging  perflutren protein A microsphere, 0.5 mL, intravenous, Once in imaging  sulfur hexafluoride microsphr, 2 mL, intravenous, Once in imaging      Continuous medications     PRN medications  PRN medications: acetaminophen **OR** acetaminophen **OR** acetaminophen, ondansetron **OR** ondansetron     Results for orders placed or performed during the hospital encounter of 10/19/24 (from the past 24 " hours)   Sars-CoV-2 PCR   Result Value Ref Range    Coronavirus 2019, PCR Not Detected Not Detected   Influenza A, and B PCR   Result Value Ref Range    Flu A Result Not Detected Not Detected    Flu B Result Not Detected Not Detected   Urinalysis with Reflex Microscopic   Result Value Ref Range    Color, Urine Yellow Light-Yellow, Yellow, Dark-Yellow    Appearance, Urine Ex.Turbid (N) Clear    Specific Gravity, Urine 1.012 1.005 - 1.035    pH, Urine 6.5 5.0, 5.5, 6.0, 6.5, 7.0, 7.5, 8.0    Protein, Urine 200 (2+) (A) NEGATIVE, 10 (TRACE), 20 (TRACE) mg/dL    Glucose, Urine Normal Normal mg/dL    Blood, Urine 1.0 (3+) (A) NEGATIVE    Ketones, Urine NEGATIVE NEGATIVE mg/dL    Bilirubin, Urine NEGATIVE NEGATIVE    Urobilinogen, Urine Normal Normal mg/dL    Nitrite, Urine NEGATIVE NEGATIVE    Leukocyte Esterase, Urine 500 Chavo/µL (A) NEGATIVE   Microscopic Only, Urine   Result Value Ref Range    WBC, Urine >50 (A) 1-5, NONE /HPF    WBC Clumps, Urine MANY Reference range not established. /HPF    RBC, Urine >20 (A) NONE, 1-2, 3-5 /HPF    Squamous Epithelial Cells, Urine 10-25 (FEW) Reference range not established. /HPF    Mucus, Urine FEW Reference range not established. /LPF   hCG, Urine, Qualitative   Result Value Ref Range    HCG, Urine NEGATIVE NEGATIVE   CBC and Auto Differential   Result Value Ref Range    WBC 10.3 4.4 - 11.3 x10*3/uL    nRBC 0.0 0.0 - 0.0 /100 WBCs    RBC 4.83 4.00 - 5.20 x10*6/uL    Hemoglobin 14.9 12.0 - 16.0 g/dL    Hematocrit 44.7 36.0 - 46.0 %    MCV 93 80 - 100 fL    MCH 30.8 26.0 - 34.0 pg    MCHC 33.3 32.0 - 36.0 g/dL    RDW 12.6 11.5 - 14.5 %    Platelets 246 150 - 450 x10*3/uL    Neutrophils % 78.4 40.0 - 80.0 %    Immature Granulocytes %, Automated 0.4 0.0 - 0.9 %    Lymphocytes % 10.0 13.0 - 44.0 %    Monocytes % 10.4 2.0 - 10.0 %    Eosinophils % 0.3 0.0 - 6.0 %    Basophils % 0.5 0.0 - 2.0 %    Neutrophils Absolute 8.04 (H) 1.20 - 7.70 x10*3/uL    Immature Granulocytes Absolute,  Automated 0.04 0.00 - 0.70 x10*3/uL    Lymphocytes Absolute 1.03 (L) 1.20 - 4.80 x10*3/uL    Monocytes Absolute 1.07 (H) 0.10 - 1.00 x10*3/uL    Eosinophils Absolute 0.03 0.00 - 0.70 x10*3/uL    Basophils Absolute 0.05 0.00 - 0.10 x10*3/uL   Comprehensive metabolic panel   Result Value Ref Range    Glucose 143 (H) 74 - 99 mg/dL    Sodium 134 (L) 136 - 145 mmol/L    Potassium 3.4 (L) 3.5 - 5.3 mmol/L    Chloride 98 98 - 107 mmol/L    Bicarbonate 26 21 - 32 mmol/L    Anion Gap 13 10 - 20 mmol/L    Urea Nitrogen 6 6 - 23 mg/dL    Creatinine 0.88 0.50 - 1.05 mg/dL    eGFR 82 >60 mL/min/1.73m*2    Calcium 9.1 8.6 - 10.3 mg/dL    Albumin 3.8 3.4 - 5.0 g/dL    Alkaline Phosphatase 80 33 - 110 U/L    Total Protein 7.3 6.4 - 8.2 g/dL    AST 19 9 - 39 U/L    Bilirubin, Total 1.0 0.0 - 1.2 mg/dL    ALT 32 7 - 45 U/L   Lactate   Result Value Ref Range    Lactate 2.4 (H) 0.4 - 2.0 mmol/L   Blood Culture    Specimen: Peripheral Venipuncture; Blood culture   Result Value Ref Range    Blood Culture Loaded on Instrument - Culture in progress    Blood Culture    Specimen: Peripheral Venipuncture; Blood culture   Result Value Ref Range    Blood Culture Loaded on Instrument - Culture in progress    Trichomonas Wet Prep Reflex to PCR   Result Value Ref Range    Trichomonas None Seen None Seen    Clue Cells None Seen None Seen    Yeast None Seen None Seen    WBC 1-2     Trichomonas reflex comment       Trichomonas was not seen by wet prep. Reflex Trichomonas vaginalis by Amplified Detection.   Lactate   Result Value Ref Range    Lactate 1.3 0.4 - 2.0 mmol/L     CT abdomen pelvis wo IV contrast    Result Date: 10/20/2024  Interpreted By:  Jefferson Starr, STUDY: CT ABDOMEN PELVIS WO IV CONTRAST;  10/20/2024 5:15 am   INDICATION: Signs/Symptoms:flank pain.   COMPARISON: None.   ACCESSION NUMBER(S): [id number]   ORDERING CLINICIAN: ITZEL SILVERIO   TECHNIQUE: Axial noncontrast CT images of the abdomen and pelvis with coronal and sagittal  reconstructed images.   FINDINGS: Lack of intravenous contrast limits evaluation of vessels and solid organs.   LOWER CHEST: Unremarkable. BONES: No acute osseous abnormality. Degenerative endplate sclerosis of the T11 vertebral body. ABDOMINAL WALL: Fat containing umbilical hernia with a 1 cm neck.   ABDOMEN:   LIVER: Subcentimeter hypodensity in segment 8, likely a cyst. Hepatic steatosis. BILE DUCTS: Unremarkable. GALLBLADDER: Unremarkable. PANCREAS:Unremarkable. SPLEEN: Unremarkable. ADRENALS: Unremarkable. KIDNEYS and URETERS: No nephrolithiasis or hydroureteronephrosis. Left-greater-than-right perinephric stranding and edematous appearance of the renal parenchyma. VESSELS: Unremarkable. LYMPH NODES: Unremarkable. RETROPERITONEUM/PERITONEUM: Unremarkable. BOWEL: Unremarkable.   PELVIS:   REPRODUCTIVE ORGANS: No pelvic masses. BLADDER: Circumferential wall thickening of the bladder.       1. Circumferential bladder wall thickening is suspicious for cystitis. Correlate with urinalysis. 2. Left-greater-than-right perinephric stranding and renal parenchymal edema. Findings are nonspecific but can be seen with acute pyelonephritis, correlate with urinalysis. 3. No nephrolithiasis or hydroureteronephrosis.   MACRO: None   Signed by: Jefferson Starr 10/20/2024 6:38 AM Dictation workstation:   [id number]    XR chest 1 view    Result Date: 10/19/2024  Interpreted By:  Oren Encinas, STUDY: XR CHEST 1 VIEW   INDICATION: Signs/Symptoms:Cough.   COMPARISON: None   ACCESSION NUMBER(S): [id number]   ORDERING CLINICIAN: JACINTO CAMILO   FINDINGS: No consolidation, effusion, edema, or pneumothorax. Heart size upper limits of normal.       No evidence of acute intrathoracic abnormality.   Signed by: Oren Encinas 10/19/2024 10:56 PM Dictation workstation:   [id number]        Assessment/Plan   Assessment & Plan      Chalise N Merlin is a 44 y.o. female with PMH of PE (2014, was on AC for 1 year), HTN, HLD, WHITFIELD, who presented  with fever/chills, bodyaches, nausea, and bilateral lower back pain x 2 days.  She also has lower abdominal pressure during and after urination.  Her symptoms all started suddenly on Friday; she did not have any urinary symptoms prior to this.  She did note some blood tinged in her urine last night.  She reports having a UTI in the past, but no problem with frequent or recurrent.  In ED, UA was positive for UTI.  A CT A/P showed bladder wall thickening, and left greater than right perinephric stranding and renal parenchymal edema suggestive of acute pyelonephritis.  There was no nephrolithiasis or hydroureteronephrosis.  She had no leukocytosis with WBC at 10.3, labs unremarkable aside from mild hyponatremia 134, hypokalemia 3.4. She had low grade temperature of 100.2, tachycardia to 105.  Initial blood pressure was elevated but rechecks have stabilized. Initial lactate elevated at 2.4, improved to 1.3    Acute pyelonephritis   - continue IV ceftriaxone 2 g q24  - ID has been consulted   - follow blood and urine cultures   - encourage oral hydration (defer IV fluids now due to national shortage, no ongoing s/s sepsis, kidney function stable. Did receive 2 L in ED.)    HTN  - stable, continue home medications with holding parameters for low BP     HLD   - on statin     WHITFIELD   - stable     VTE/GI Prophylaxis   - subcutaneous lovenox   - bowel regimen in place     Discharge Planning   - plan to discharge home when medically stable.      Discussed with Dr. Walter.     I spent 45 minutes in the professional and overall care of this patient.      Natividad Charles, APRN-CNP

## 2024-10-21 VITALS
SYSTOLIC BLOOD PRESSURE: 135 MMHG | RESPIRATION RATE: 19 BRPM | WEIGHT: 293 LBS | TEMPERATURE: 97 F | DIASTOLIC BLOOD PRESSURE: 84 MMHG | OXYGEN SATURATION: 95 % | HEIGHT: 68 IN | HEART RATE: 69 BPM | BODY MASS INDEX: 44.41 KG/M2

## 2024-10-21 LAB
ANION GAP SERPL CALC-SCNC: 11 MMOL/L (ref 10–20)
BUN SERPL-MCNC: 7 MG/DL (ref 6–23)
C TRACH RRNA SPEC QL NAA+PROBE: NEGATIVE
CALCIUM SERPL-MCNC: 8.2 MG/DL (ref 8.6–10.3)
CHLORIDE SERPL-SCNC: 102 MMOL/L (ref 98–107)
CO2 SERPL-SCNC: 25 MMOL/L (ref 21–32)
CREAT SERPL-MCNC: 0.79 MG/DL (ref 0.5–1.05)
CRP SERPL-MCNC: 7.67 MG/DL
EGFRCR SERPLBLD CKD-EPI 2021: >90 ML/MIN/1.73M*2
ERYTHROCYTE [DISTWIDTH] IN BLOOD BY AUTOMATED COUNT: 12.4 % (ref 11.5–14.5)
GLUCOSE SERPL-MCNC: 100 MG/DL (ref 74–99)
HCT VFR BLD AUTO: 42.7 % (ref 36–46)
HGB BLD-MCNC: 13.8 G/DL (ref 12–16)
MAGNESIUM SERPL-MCNC: 1.8 MG/DL (ref 1.6–2.4)
MCH RBC QN AUTO: 31 PG (ref 26–34)
MCHC RBC AUTO-ENTMCNC: 32.3 G/DL (ref 32–36)
MCV RBC AUTO: 96 FL (ref 80–100)
N GONORRHOEA DNA SPEC QL PROBE+SIG AMP: NEGATIVE
NRBC BLD-RTO: 0 /100 WBCS (ref 0–0)
PLATELET # BLD AUTO: 206 X10*3/UL (ref 150–450)
POTASSIUM SERPL-SCNC: 3.4 MMOL/L (ref 3.5–5.3)
RBC # BLD AUTO: 4.45 X10*6/UL (ref 4–5.2)
SODIUM SERPL-SCNC: 135 MMOL/L (ref 136–145)
T VAGINALIS RRNA SPEC QL NAA+PROBE: NEGATIVE
WBC # BLD AUTO: 8.9 X10*3/UL (ref 4.4–11.3)

## 2024-10-21 PROCEDURE — 85027 COMPLETE CBC AUTOMATED: CPT | Performed by: INTERNAL MEDICINE

## 2024-10-21 PROCEDURE — 2500000001 HC RX 250 WO HCPCS SELF ADMINISTERED DRUGS (ALT 637 FOR MEDICARE OP): Performed by: NURSE PRACTITIONER

## 2024-10-21 PROCEDURE — 99238 HOSP IP/OBS DSCHRG MGMT 30/<: CPT

## 2024-10-21 PROCEDURE — 2500000002 HC RX 250 W HCPCS SELF ADMINISTERED DRUGS (ALT 637 FOR MEDICARE OP, ALT 636 FOR OP/ED)

## 2024-10-21 PROCEDURE — 2500000004 HC RX 250 GENERAL PHARMACY W/ HCPCS (ALT 636 FOR OP/ED): Performed by: INTERNAL MEDICINE

## 2024-10-21 PROCEDURE — 2500000004 HC RX 250 GENERAL PHARMACY W/ HCPCS (ALT 636 FOR OP/ED)

## 2024-10-21 PROCEDURE — 96376 TX/PRO/DX INJ SAME DRUG ADON: CPT | Mod: 59

## 2024-10-21 PROCEDURE — 96372 THER/PROPH/DIAG INJ SC/IM: CPT | Performed by: INTERNAL MEDICINE

## 2024-10-21 PROCEDURE — 2500000001 HC RX 250 WO HCPCS SELF ADMINISTERED DRUGS (ALT 637 FOR MEDICARE OP): Performed by: INTERNAL MEDICINE

## 2024-10-21 PROCEDURE — 83735 ASSAY OF MAGNESIUM: CPT | Performed by: INTERNAL MEDICINE

## 2024-10-21 PROCEDURE — 2500000004 HC RX 250 GENERAL PHARMACY W/ HCPCS (ALT 636 FOR OP/ED): Performed by: NURSE PRACTITIONER

## 2024-10-21 PROCEDURE — G0378 HOSPITAL OBSERVATION PER HR: HCPCS

## 2024-10-21 PROCEDURE — 86140 C-REACTIVE PROTEIN: CPT | Performed by: INTERNAL MEDICINE

## 2024-10-21 PROCEDURE — 80048 BASIC METABOLIC PNL TOTAL CA: CPT | Performed by: INTERNAL MEDICINE

## 2024-10-21 PROCEDURE — 96366 THER/PROPH/DIAG IV INF ADDON: CPT | Mod: 59

## 2024-10-21 PROCEDURE — 36415 COLL VENOUS BLD VENIPUNCTURE: CPT | Performed by: INTERNAL MEDICINE

## 2024-10-21 RX ORDER — POTASSIUM CHLORIDE 20 MEQ/1
40 TABLET, EXTENDED RELEASE ORAL ONCE
Status: COMPLETED | OUTPATIENT
Start: 2024-10-21 | End: 2024-10-21

## 2024-10-21 RX ORDER — LEVOFLOXACIN 750 MG/1
750 TABLET ORAL DAILY
Qty: 7 TABLET | Refills: 0 | Status: SHIPPED | OUTPATIENT
Start: 2024-10-22 | End: 2024-10-29

## 2024-10-21 RX ORDER — ACETAMINOPHEN 160 MG/5ML
650 SOLUTION ORAL EVERY 4 HOURS PRN
Status: DISCONTINUED | OUTPATIENT
Start: 2024-10-21 | End: 2024-10-21 | Stop reason: HOSPADM

## 2024-10-21 RX ORDER — ACETAMINOPHEN 325 MG/1
975 TABLET ORAL EVERY 6 HOURS PRN
Status: DISCONTINUED | OUTPATIENT
Start: 2024-10-21 | End: 2024-10-21 | Stop reason: HOSPADM

## 2024-10-21 RX ORDER — ACETAMINOPHEN 650 MG/1
650 SUPPOSITORY RECTAL EVERY 4 HOURS PRN
Status: DISCONTINUED | OUTPATIENT
Start: 2024-10-21 | End: 2024-10-21 | Stop reason: HOSPADM

## 2024-10-21 RX ORDER — ERGOCALCIFEROL 1.25 MG/1
1 CAPSULE ORAL
COMMUNITY
Start: 2024-05-31

## 2024-10-21 RX ADMIN — ASPIRIN 81 MG: 81 TABLET, COATED ORAL at 08:42

## 2024-10-21 RX ADMIN — ACETAMINOPHEN 650 MG: 650 LIQUID ORAL at 10:02

## 2024-10-21 RX ADMIN — ATORVASTATIN CALCIUM 20 MG: 20 TABLET, FILM COATED ORAL at 08:42

## 2024-10-21 RX ADMIN — PANTOPRAZOLE SODIUM 40 MG: 40 TABLET, DELAYED RELEASE ORAL at 06:26

## 2024-10-21 RX ADMIN — KETOROLAC TROMETHAMINE 30 MG: 30 INJECTION, SOLUTION INTRAMUSCULAR at 08:41

## 2024-10-21 RX ADMIN — DEXTROSE MONOHYDRATE 2 G: 5 INJECTION INTRAVENOUS at 18:33

## 2024-10-21 RX ADMIN — METOPROLOL SUCCINATE 50 MG: 50 TABLET, EXTENDED RELEASE ORAL at 08:42

## 2024-10-21 RX ADMIN — POTASSIUM CHLORIDE 40 MEQ: 1500 TABLET, EXTENDED RELEASE ORAL at 10:02

## 2024-10-21 RX ADMIN — DEXTROSE MONOHYDRATE 2 G: 5 INJECTION INTRAVENOUS at 01:42

## 2024-10-21 RX ADMIN — ACETAMINOPHEN 325MG 650 MG: 325 TABLET ORAL at 01:46

## 2024-10-21 RX ADMIN — ENOXAPARIN SODIUM 40 MG: 40 INJECTION SUBCUTANEOUS at 08:42

## 2024-10-21 ASSESSMENT — COGNITIVE AND FUNCTIONAL STATUS - GENERAL
MOBILITY SCORE: 24
DAILY ACTIVITIY SCORE: 24

## 2024-10-21 ASSESSMENT — PAIN - FUNCTIONAL ASSESSMENT: PAIN_FUNCTIONAL_ASSESSMENT: 0-10

## 2024-10-21 ASSESSMENT — PAIN SCALES - WONG BAKER
WONGBAKER_NUMERICALRESPONSE: HURTS LITTLE BIT
WONGBAKER_NUMERICALRESPONSE: HURTS LITTLE BIT

## 2024-10-21 ASSESSMENT — PAIN SCALES - GENERAL
PAINLEVEL_OUTOF10: 5 - MODERATE PAIN
PAINLEVEL_OUTOF10: 6
PAINLEVEL_OUTOF10: 3

## 2024-10-21 ASSESSMENT — PAIN DESCRIPTION - LOCATION: LOCATION: BACK

## 2024-10-21 ASSESSMENT — PAIN DESCRIPTION - ORIENTATION: ORIENTATION: LOWER

## 2024-10-21 NOTE — CARE PLAN
The patient's goals for the shift include injury free    The clinical goals for the shift include pain management     Over the shift, the patient did not make progress toward the following goals. Barriers to progression include discomfort. Recommendations to address these barriers include increase comfort.          Problem: Pain - Adult  Goal: Verbalizes/displays adequate comfort level or baseline comfort level  Outcome: Progressing     Problem: Pain  Goal: Walks with improved pain control throughout the shift  Outcome: Progressing     Problem: Pain  Goal: Performs ADL's with improved pain control throughout shift  Outcome: Progressing     Problem: Pain  Goal: Free from opioid side effects throughout the shift  Outcome: Progressing

## 2024-10-21 NOTE — PROGRESS NOTES
Pharmacy Medication History Review    Chalise N Merlin is a 44 y.o. female admitted for Sepsis (Multi). Pharmacy reviewed the patient's [id number] medications and allergies for accuracy.    Below are additional concerns with the patient's PTA list.  Collected from patient.     The list below reflectives the updated PTA list. Please review each medication in order reconciliation for additional clarification and justification.    Prior to Admission Medications   Prescriptions   amLODIPine (Norvasc) 5 mg tablet   Sig: Take 1 tablet (5 mg) by mouth once daily.   aspirin 81 mg EC tablet   Sig: Take 1 tablet (81 mg) by mouth once daily.   atorvastatin (Lipitor) 20 mg tablet   Sig: Take 1 tablet (20 mg) by mouth once daily.   ergocalciferol (Vitamin D-2) 1.25 MG (08160 UT) capsule   Sig: Take 1 capsule (1,250 mcg) by mouth every 7 days.   metoprolol succinate XL (Toprol-XL) 50 mg 24 hr tablet   Sig: Take 1 tablet (50 mg) by mouth once daily.      Facility-Administered Medications: None        Argelia Fuller, JohnathanD

## 2024-10-21 NOTE — PROGRESS NOTES
Transitional Care Coordination Progress Note:  Plan per Medical/Surgical team: treatment of UTI with IV ATB  Status: Observation  Payor source: [employer] medicaid  Discharge disposition: Home with daughter   Potential Barriers: pain & nausea  ADOD: 10/21/2024   JOE Butcher RN, BSN Transitional Care Coordinator ED# [phone]      10/21/24 0659   Discharge Planning   Assistance Needed ATB plan, pain management

## 2024-10-21 NOTE — PROGRESS NOTES
Chalise N Merlin is a 44 y.o. female with past medical history of PE (2014, was on AC for 1 year), HTN, HLD, WHITFIELD, on day 1 of admission presenting with fever/chills, bodyaches, nausea, and bilateral lower back pain x 2 days.       Josh Huddleston was seen and evaluated this morning. Patient lying in bed. She states she is feeling better today. Patient notes she has felt slight fevers and chills, but states they are not as severe as yesterday. Of note, patient with temperature of 100.2 this morning. Patient states her dysuria and back pain have improved. Patient states she has had a headache since yesterday that is not improving with tylenol and Toradol.        Objective     Last Recorded Vitals  /78 (BP Location: Right arm, Patient Position: Lying)   Pulse 72   Temp 36.9 °C (98.5 °F) (Temporal)   Resp 20   Wt 145 kg (319 lb 10.7 oz)   SpO2 95%   Intake/Output last 3 Shifts:    Intake/Output Summary (Last 24 hours) at 10/21/2024 1522  Last data filed at 10/21/2024 0403  Gross per 24 hour   Intake 50 ml   Output --   Net 50 ml       Admission Weight  Weight: 145 kg (320 lb) (10/19/24 2125)    Daily Weight  10/20/24 : 145 kg (319 lb 10.7 oz)    Image Results  ECG 12 lead  Sinus tachycardia  Nonspecific ST abnormality  Abnormal ECG  When compared with ECG of 22-APR-2024 16:21,  Vent. rate has increased BY  56 BPM  Inverted T waves have replaced nonspecific T wave abnormality in Inferior leads  T wave amplitude has decreased in Lateral leads      Physical Exam  Vitals reviewed.   Cardiovascular:      Rate and Rhythm: Normal rate and regular rhythm.      Heart sounds: Normal heart sounds.   Pulmonary:      Effort: Pulmonary effort is normal. No respiratory distress.      Breath sounds: Normal breath sounds. No wheezing.   Abdominal:      General: Abdomen is flat. Bowel sounds are normal. There is no distension.      Palpations: Abdomen is soft.      Tenderness: There is no abdominal tenderness. There is  no right CVA tenderness or left CVA tenderness.   Neurological:      General: No focal deficit present.      Mental Status: She is alert.   Psychiatric:         Mood and Affect: Mood normal.         Behavior: Behavior normal.       Relevant Results    Scheduled medications  [Held by provider] amLODIPine, 5 mg, oral, Daily  aspirin, 81 mg, oral, Daily  atorvastatin, 20 mg, oral, Daily  cefTRIAXone, 2 g, intravenous, q24h  enoxaparin, 40 mg, subcutaneous, q12h YOLANDA  metoprolol succinate XL, 50 mg, oral, Daily  pantoprazole, 40 mg, oral, Daily before breakfast   Or  pantoprazole, 40 mg, intravenous, Daily before breakfast  perflutren lipid microspheres, 0.5-10 mL of dilution, intravenous, Once in imaging  perflutren protein A microsphere, 0.5 mL, intravenous, Once in imaging  sulfur hexafluoride microsphr, 2 mL, intravenous, Once in imaging      Continuous medications     PRN medications  PRN medications: acetaminophen **OR** acetaminophen **OR** acetaminophen, ketorolac, ondansetron **OR** ondansetron, polyethylene glycol  Results for orders placed or performed during the hospital encounter of 10/19/24 (from the past 24 hours)   CBC   Result Value Ref Range    WBC 8.9 4.4 - 11.3 x10*3/uL    nRBC 0.0 0.0 - 0.0 /100 WBCs    RBC 4.45 4.00 - 5.20 x10*6/uL    Hemoglobin 13.8 12.0 - 16.0 g/dL    Hematocrit 42.7 36.0 - 46.0 %    MCV 96 80 - 100 fL    MCH 31.0 26.0 - 34.0 pg    MCHC 32.3 32.0 - 36.0 g/dL    RDW 12.4 11.5 - 14.5 %    Platelets 206 150 - 450 x10*3/uL   Basic metabolic panel   Result Value Ref Range    Glucose 100 (H) 74 - 99 mg/dL    Sodium 135 (L) 136 - 145 mmol/L    Potassium 3.4 (L) 3.5 - 5.3 mmol/L    Chloride 102 98 - 107 mmol/L    Bicarbonate 25 21 - 32 mmol/L    Anion Gap 11 10 - 20 mmol/L    Urea Nitrogen 7 6 - 23 mg/dL    Creatinine 0.79 0.50 - 1.05 mg/dL    eGFR >90 >60 mL/min/1.73m*2    Calcium 8.2 (L) 8.6 - 10.3 mg/dL   C-reactive protein   Result Value Ref Range    C-Reactive Protein 7.67 (H) <1.00  mg/dL   Magnesium   Result Value Ref Range    Magnesium 1.80 1.60 - 2.40 mg/dL     ECG 12 lead    Result Date: 10/21/2024  Sinus tachycardia Nonspecific ST abnormality Abnormal ECG When compared with ECG of 22-APR-2024 16:21, Vent. rate has increased BY  56 BPM Inverted T waves have replaced nonspecific T wave abnormality in Inferior leads T wave amplitude has decreased in Lateral leads    CT abdomen pelvis wo IV contrast    Result Date: 10/20/2024  Interpreted By:  Jefferson Starr, STUDY: CT ABDOMEN PELVIS WO IV CONTRAST;  10/20/2024 5:15 am   INDICATION: Signs/Symptoms:flank pain.   COMPARISON: None.   ACCESSION NUMBER(S): [id number]   ORDERING CLINICIAN: ITZEL SILVERIO   TECHNIQUE: Axial noncontrast CT images of the abdomen and pelvis with coronal and sagittal reconstructed images.   FINDINGS: Lack of intravenous contrast limits evaluation of vessels and solid organs.   LOWER CHEST: Unremarkable. BONES: No acute osseous abnormality. Degenerative endplate sclerosis of the T11 vertebral body. ABDOMINAL WALL: Fat containing umbilical hernia with a 1 cm neck.   ABDOMEN:   LIVER: Subcentimeter hypodensity in segment 8, likely a cyst. Hepatic steatosis. BILE DUCTS: Unremarkable. GALLBLADDER: Unremarkable. PANCREAS:Unremarkable. SPLEEN: Unremarkable. ADRENALS: Unremarkable. KIDNEYS and URETERS: No nephrolithiasis or hydroureteronephrosis. Left-greater-than-right perinephric stranding and edematous appearance of the renal parenchyma. VESSELS: Unremarkable. LYMPH NODES: Unremarkable. RETROPERITONEUM/PERITONEUM: Unremarkable. BOWEL: Unremarkable.   PELVIS:   REPRODUCTIVE ORGANS: No pelvic masses. BLADDER: Circumferential wall thickening of the bladder.       1. Circumferential bladder wall thickening is suspicious for cystitis. Correlate with urinalysis. 2. Left-greater-than-right perinephric stranding and renal parenchymal edema. Findings are nonspecific but can be seen with acute pyelonephritis, correlate with  urinalysis. 3. No nephrolithiasis or hydroureteronephrosis.   MACRO: None   Signed by: Jefferson Starr 10/20/2024 6:38 AM Dictation workstation:   [id number]    XR chest 1 view    Result Date: 10/19/2024  Interpreted By:  Oren Encinas, STUDY: XR CHEST 1 VIEW   INDICATION: Signs/Symptoms:Cough.   COMPARISON: None   ACCESSION NUMBER(S): [id number]   ORDERING CLINICIAN: JACINTO CAMILO   FINDINGS: No consolidation, effusion, edema, or pneumothorax. Heart size upper limits of normal.       No evidence of acute intrathoracic abnormality.   Signed by: Oren Encinas 10/19/2024 10:56 PM Dictation workstation:   [id number]     Assessment/Plan        Assessment & Plan    Chalise N Merlin is a 44 y.o. female with PMH of PE (2014, was on AC for 1 year), HTN, HLD, WHITFIELD, who presented with fever/chills, bodyaches, nausea, and bilateral lower back pain x 2 days.  She also has lower abdominal pressure during and after urination.  Her symptoms started suddenly on Friday. She states she did not have any urinary symptoms prior to this. She did note some blood tinged urine Saturday night.  She reports having a UTI in the past, but no problem with frequent or recurrent.  In ED, UA was positive for UTI.  A CT A/P showed bladder wall thickening, and left greater than right perinephric stranding and renal parenchymal edema suggestive of acute pyelonephritis.  There was no nephrolithiasis or hydroureteronephrosis.  She had no leukocytosis with WBC at 10.3, labs unremarkable aside from mild hyponatremia 134, hypokalemia 3.4. She had low grade temperature of 100.2, tachycardia to 105.  Initial blood pressure was elevated but rechecks have stabilized. Initial lactate elevated at 2.4, improved to 1.3     Acute pyelonephritis   - Continue IV ceftriaxone 2 g q24  - Blood cultures: one positive for e. coli, one with no growth at one day  - Urine cultures positive with enteric bacilli  - Temperature of 100.2 this morning, will continue to monitor  for further fevers  - encourage oral hydration (defer IV fluids now due to national shortage, no ongoing s/s sepsis, kidney function stable. Did receive 2 L in ED.)    Acute Headache  - Increased tylenol to 975 mg every 6 hours PRN     HTN  - stable, continue home medications with holding parameters for low BP      HLD   - Continue Lipitor      WHITFIELD   - stable      VTE/GI Prophylaxis   - Continue subcutaneous lovenox   - bowel regimen in place      Discharge Planning   - plan to discharge home when medically stable.          Discussed with Dr Fredi Ornelas PA-C  10/21/2024  3:22 PM

## 2024-10-21 NOTE — CARE PLAN
The patient's goals for the shift include      The clinical goals for the shift include pt to remain free from fever      Problem: Pain - Adult  Goal: Verbalizes/displays adequate comfort level or baseline comfort level  Outcome: Progressing     Problem: Safety - Adult  Goal: Free from fall injury  Outcome: Progressing     Problem: Discharge Planning  Goal: Discharge to home or other facility with appropriate resources  Outcome: Progressing     Problem: Pain  Goal: Walks with improved pain control throughout the shift  Outcome: Progressing  Goal: Performs ADL's with improved pain control throughout shift  Outcome: Progressing  Goal: Free from opioid side effects throughout the shift  Outcome: Progressing

## 2024-10-21 NOTE — DISCHARGE SUMMARY
Discharge Diagnosis  Sepsis, acute pyelonephritis    Issues Requiring Follow-Up  Follow up with PCP for acute pyelonephritis and culture results.     Discharge Meds     Medication List      START taking these medications     levoFLOXacin 750 mg tablet; Commonly known as: Levaquin; Take 1 tablet   (750 mg) by mouth once daily for 7 days. Do not fill before October 22, 2024.; Start taking on: October 22, 2024     CONTINUE taking these medications     amLODIPine 5 mg tablet; Commonly known as: Norvasc   aspirin 81 mg EC tablet   atorvastatin 20 mg tablet; Commonly known as: Lipitor   ergocalciferol 1.25 MG (87884 UT) capsule; Commonly known as: Vitamin   D-2   metoprolol succinate XL 50 mg 24 hr tablet; Commonly known as: Toprol-XL       Test Results Pending At Discharge  Pending Labs       Order Current Status    Blood Culture Preliminary result    Blood Culture Preliminary result    Urine culture Preliminary result            Hospital Course  Chalise N Merlin is a 44 y.o. female with PMH of PE (2014, was on AC for 1 year), HTN, HLD, WHITFIELD, who presented with fever/chills, bodyaches, nausea, and bilateral lower back pain x 2 days.  She also has lower abdominal pressure during and after urination.  Her symptoms started suddenly on Friday. She states she did not have any urinary symptoms prior to this. She did note some blood tinged urine Saturday night.  She reports having a UTI in the past, but no problem with frequent or recurrent.  In ED, UA was positive for UTI.  A CT A/P showed bladder wall thickening, and left greater than right perinephric stranding and renal parenchymal edema suggestive of acute pyelonephritis.  There was no nephrolithiasis or hydroureteronephrosis.  She had no leukocytosis with WBC at 10.3, labs unremarkable aside from mild hyponatremia 134, hypokalemia 3.4. She had low grade temperature of 100.2, tachycardia to 105.  Initial blood pressure was elevated but rechecks have stabilized. Initial  lactate elevated at 2.4, improved to 1.3     Acute pyelonephritis   - Clinically, symptoms improving  - Complete third dose of IV ceftriaxone 2 g prior to discharge  - Begin oral Levaquin on 10/22, complete 7 day course   - Blood cultures: one positive for e. coli, one with no growth at one day. Cultures will be followed on discharged. Also, referral placed to follow with PCP to follow with culture results.   - Urine cultures positive with enteric bacilli  - Temperature of 100.2 this morning, no further temperatures above 100 today  - encourage oral hydration      Acute Headache  - Resolved  - Increased tylenol to 975 mg every 6 hours PRN  - Continue tylenol as needed for headaches at home     Discharge Planning   - Discharge home  - follow up with PCP within one week     Pertinent Physical Exam At Time of Discharge  Physical Exam  Vitals reviewed.   Constitutional:       General: She is not in acute distress.  Cardiovascular:      Rate and Rhythm: Normal rate and regular rhythm.      Heart sounds: Normal heart sounds.   Pulmonary:      Effort: Pulmonary effort is normal. No respiratory distress.      Breath sounds: Normal breath sounds. No wheezing.   Abdominal:      General: Abdomen is flat. Bowel sounds are normal. There is no distension.      Palpations: Abdomen is soft.      Tenderness: There is no abdominal tenderness.   Skin:     General: Skin is warm and dry.   Neurological:      General: No focal deficit present.      Mental Status: She is alert.   Psychiatric:         Mood and Affect: Mood normal.         Behavior: Behavior normal.         Outpatient Follow-Up  Future Appointments   Date Time Provider Department Center   1/6/2025  9:30 AM Sid Ernst MD [id number]     Discussed with Dr Fredi Ornelas PA-C  10/21/2024  4:22 PM

## 2024-10-22 LAB — BACTERIA UR CULT: ABNORMAL

## 2024-10-24 LAB
BACTERIA BLD AEROBE CULT: ABNORMAL
BACTERIA BLD CULT: ABNORMAL
BACTERIA BLD CULT: NORMAL
GRAM STN SPEC: ABNORMAL

## 2024-10-26 LAB
ATRIAL RATE: 119 BPM
P AXIS: 43 DEGREES
P OFFSET: 212 MS
P ONSET: 159 MS
PR INTERVAL: 136 MS
Q ONSET: 227 MS
QRS COUNT: 20 BEATS
QRS DURATION: 94 MS
QT INTERVAL: 332 MS
QTC CALCULATION(BAZETT): 467 MS
QTC FREDERICIA: 417 MS
R AXIS: 14 DEGREES
T AXIS: 13 DEGREES
T OFFSET: 393 MS
VENTRICULAR RATE: 119 BPM

## 2024-10-29 ENCOUNTER — APPOINTMENT (OUTPATIENT)
Dept: PRIMARY CARE | Facility: CLINIC | Age: 44
End: 2024-10-29
Payer: COMMERCIAL

## 2024-11-20 ENCOUNTER — PATIENT OUTREACH (OUTPATIENT)
Dept: CARE COORDINATION | Facility: CLINIC | Age: 44
End: 2024-11-20
Payer: COMMERCIAL

## 2024-11-20 NOTE — PROGRESS NOTES
Outreach call to patient to follow up after 30 days of hospital discharge. Unable to connect with patient. Will dis enroll patient from transitions of care.

## 2024-12-11 ENCOUNTER — APPOINTMENT (OUTPATIENT)
Dept: PRIMARY CARE | Facility: CLINIC | Age: 44
End: 2024-12-11
Payer: COMMERCIAL

## 2024-12-18 ENCOUNTER — APPOINTMENT (OUTPATIENT)
Dept: PRIMARY CARE | Facility: CLINIC | Age: 44
End: 2024-12-18
Payer: COMMERCIAL

## 2025-01-06 ENCOUNTER — APPOINTMENT (OUTPATIENT)
Dept: DERMATOLOGY | Facility: CLINIC | Age: 45
End: 2025-01-06
Payer: COMMERCIAL

## 2025-04-06 ENCOUNTER — APPOINTMENT (OUTPATIENT)
Dept: URGENT CARE | Age: 45
End: 2025-04-06
Payer: COMMERCIAL

## 2025-04-16 ENCOUNTER — APPOINTMENT (OUTPATIENT)
Dept: BEHAVIORAL HEALTH | Facility: CLINIC | Age: 45
End: 2025-04-16
Payer: COMMERCIAL

## 2025-04-16 DIAGNOSIS — F32.A ANXIETY AND DEPRESSION: ICD-10-CM

## 2025-04-16 DIAGNOSIS — F41.9 ANXIETY AND DEPRESSION: ICD-10-CM
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
## 2025-04-28 ENCOUNTER — APPOINTMENT (OUTPATIENT)
Dept: OBSTETRICS AND GYNECOLOGY | Facility: CLINIC | Age: 45
End: 2025-04-28
Payer: COMMERCIAL

## 2025-05-21 ENCOUNTER — APPOINTMENT (OUTPATIENT)
Dept: OBSTETRICS AND GYNECOLOGY | Facility: CLINIC | Age: 45
End: 2025-05-21
Payer: COMMERCIAL

## 2025-05-21 DIAGNOSIS — N92.6 IRREGULAR MENSES: ICD-10-CM

## 2025-05-21 DIAGNOSIS — Z01.419 PAP TEST, AS PART OF ROUTINE GYNECOLOGICAL EXAMINATION: Primary | ICD-10-CM

## 2025-05-21 DIAGNOSIS — Z01.419 WOMEN'S ANNUAL ROUTINE GYNECOLOGICAL EXAMINATION: ICD-10-CM

## 2025-05-21 LAB — PREGNANCY TEST URINE, POC: NEGATIVE

## 2025-05-21 PROCEDURE — 88141 CYTOPATH C/V INTERPRET: CPT | Performed by: PATHOLOGY

## 2025-05-21 PROCEDURE — 87626 HPV SEP HI-RSK TYP&POOL RSLT: CPT

## 2025-05-21 PROCEDURE — 81025 URINE PREGNANCY TEST: CPT | Performed by: OBSTETRICS & GYNECOLOGY

## 2025-05-21 PROCEDURE — 99396 PREV VISIT EST AGE 40-64: CPT | Performed by: OBSTETRICS & GYNECOLOGY

## 2025-05-21 PROCEDURE — 88175 CYTOPATH C/V AUTO FLUID REDO: CPT

## 2025-05-21 ASSESSMENT — ENCOUNTER SYMPTOMS
OCCASIONAL FEELINGS OF UNSTEADINESS: 0
LOSS OF SENSATION IN FEET: 0
DEPRESSION: 0

## 2025-05-21 NOTE — PROGRESS NOTES
Chalise N Merlin is a 44 y.o. female who is here for a routine exam. PCP = No Assigned PCP Generic Provider, MD  Patient for annual exam.  Sexually active with steady partner not using contraception.  Patient notes that she had 2 positive urine pregnancy test at home.  Office pregnancy test is negative  As normal menstrual period was 2025.  Has missed a cycle  Patient has history of positive high risk HPV with negative Pap due for repeat    Review of Systems  Negative review of systems    Physical Exam  Genitourinary:      Genitourinary Comments: External genitalia unremarkable  Vagina clear  Cervix closed uterus prominent  Adnexa masses or tenderness  Perineum without lesions or swelling    Breast masses tenderness or nipple discharge, normal appearance   Breasts:     Breasts are soft.     Right: Normal.      Left: Normal.   HENT:      Head: Normocephalic.      Nose: Nose normal.   Eyes:      Pupils: Pupils are equal, round, and reactive to light.   Cardiovascular:      Rate and Rhythm: Normal rate and regular rhythm.   Pulmonary:      Effort: Pulmonary effort is normal.      Breath sounds: Normal breath sounds.   Abdominal:      General: Abdomen is flat. Bowel sounds are normal.      Palpations: Abdomen is soft.   Musculoskeletal:         General: Normal range of motion.      Cervical back: Normal range of motion and neck supple.   Neurological:      General: No focal deficit present.      Mental Status: She is alert.   Skin:     General: Skin is warm and dry.   Psychiatric:         Mood and Affect: Mood normal.         Behavior: Behavior normal.         Thought Content: Thought content normal.         Judgment: Judgment normal.   Vitals reviewed.     Objective   There were no vitals taken for this visit.  OB History          10    Para   5    Term   5            AB   5    Living   5         SAB   2    IAB        Ectopic        Multiple        Live Births                       GynHx:  Menarche/Menopause: 10  Periods are regular every 28-30 days, lasting 6 days.  Dysmenorrhea: none.   Cyclic symptoms include none.    Social History     Substance and Sexual Activity   Sexual Activity Yes    Partners: Male    Birth control/protection: None     Current contraception: None  STIs: none    Substance:   Tobacco Use: Low Risk  (5/21/2025)    Patient History     Smoking Tobacco Use: Never     Smokeless Tobacco Use: Never     Passive Exposure: Not on file      Social History     Substance and Sexual Activity   Drug Use Never      Social History     Substance and Sexual Activity   Alcohol Use Yes    Comment: Socially     Abuse: No  Depression Screen:   Negative    Past med hx and past surg hx reviewed    Assessment/Plan    Clinically unremarkable GYN exam.  Patient sexually active steady partner declining STD testing.  Discussed diet and exercise.  Pap smear last year was positive for high risk HPV repeat Pap done today  Patient had 2 prior positive pregnancy test at home is missed 1.  Probably pregnant.  Clinical exam shows minimally enlarged uterus will order quantitative hCG.  If positive will return in 1 week  Will call results of pregnancy test and Pap are back

## 2025-06-02 ENCOUNTER — APPOINTMENT (OUTPATIENT)
Dept: BEHAVIORAL HEALTH | Facility: CLINIC | Age: 45
End: 2025-06-02
Payer: COMMERCIAL

## 2025-06-02 DIAGNOSIS — F41.9 ANXIETY AND DEPRESSION: ICD-10-CM

## 2025-06-02 DIAGNOSIS — F32.A ANXIETY AND DEPRESSION: ICD-10-CM

## 2025-06-02 PROCEDURE — 90837 PSYTX W PT 60 MINUTES: CPT | Performed by: SOCIAL WORKER
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
## 2025-06-04 LAB
CYTOLOGY CMNT CVX/VAG CYTO-IMP: NORMAL
HPV HR 12 DNA GENITAL QL NAA+PROBE: NEGATIVE
HPV HR GENOTYPES PNL CVX NAA+PROBE: NEGATIVE
HPV16 DNA SPEC QL NAA+PROBE: NEGATIVE
HPV18 DNA SPEC QL NAA+PROBE: NEGATIVE
LAB AP HPV GENOTYPE QUESTION: YES
LAB AP HPV HR: NORMAL
LABORATORY COMMENT REPORT: NORMAL
PATH REPORT.TOTAL CANCER: NORMAL

## 2025-06-06 ENCOUNTER — APPOINTMENT (OUTPATIENT)
Dept: OBSTETRICS AND GYNECOLOGY | Facility: CLINIC | Age: 45
End: 2025-06-06
Payer: COMMERCIAL

## 2025-06-10 ENCOUNTER — APPOINTMENT (OUTPATIENT)
Dept: BEHAVIORAL HEALTH | Facility: CLINIC | Age: 45
End: 2025-06-10
Payer: COMMERCIAL

## 2025-06-10 DIAGNOSIS — F41.9 ANXIETY AND DEPRESSION: ICD-10-CM

## 2025-06-10 DIAGNOSIS — F32.A ANXIETY AND DEPRESSION: ICD-10-CM

## 2025-06-10 PROCEDURE — 90837 PSYTX W PT 60 MINUTES: CPT | Performed by: SOCIAL WORKER

## 2025-06-10 NOTE — PROGRESS NOTES
An interactive audio and video telecommunication system which permits real time communications between the patient (at the originating site) and provider (at the distant site) was utilized to provide this telehealth service.   Verbal consent was requested and obtained from this client on this date, for a telehealth visit and the patient's location was confirmed at the time of the visit.        Start time : 4:01 pm  End time : 5:02 pm    Diagnoses : MDD. CPTSD    Clients current issues: Client recognizes she freezes up in communication.  She also has anxiety that is high.   -she doesn't like shopping    Treatment interventions: IFS  Explored her part that feels the pain of others feelings and moods  She wants to fix it  Responsible to help them   I dropped the ball with my oldest and she started cutting.   I wished I could have fixed them.   I never want to miss any cues that are there.  I feel like I spend a lot of time making everyone happy.   This part wants to prevent the kids from having depression.   We were not allowed to be soft as children  Be shiny - no feelings ( what's wrong with you get it together )   I'm okay I'm fine  Lived in poverty, JAMISON remarried to a strong mean woman - coldness,  FA  in [place] by himself, birth [place]   She was made to be responsible for all her siblings, she raised her BRO when she was 10 and he was a baby   She wants to make sure the people she loves are happy and healthy   She is overly responsible and super sensitive   Feel toward --- I don't like it - it is draining her  Another part that wants this part to stop  Daughter is eyeore  I can't save people --- I am exhausted trying  Its worn out, I shut down   I helped client recognize the polarization of these parts   Both with positive agendas   Be responsible and take care of everyone part - fix them and make them happy to save them  Save myself because I am drowning   Her part that is super responsible is more  extreme  She has a part that doesn't like soft energy ( another part that likes it )       Prognosis: good    Treatment plan update: Client is invested in therapy.      Continue weekly ongoing psychotherapy.

## 2025-06-11 ENCOUNTER — LAB REQUISITION (OUTPATIENT)
Dept: LAB | Facility: HOSPITAL | Age: 45
End: 2025-06-11
Payer: COMMERCIAL

## 2025-06-11 DIAGNOSIS — N92.6 IRREGULAR MENSTRUATION, UNSPECIFIED: ICD-10-CM

## 2025-06-11 LAB — B-HCG SERPL-ACNC: <2 MIU/ML

## 2025-06-11 PROCEDURE — 84702 CHORIONIC GONADOTROPIN TEST: CPT

## 2025-06-17 ENCOUNTER — APPOINTMENT (OUTPATIENT)
Dept: OBSTETRICS AND GYNECOLOGY | Facility: CLINIC | Age: 45
End: 2025-06-17
Payer: COMMERCIAL

## 2025-06-24 ENCOUNTER — PATIENT MESSAGE (OUTPATIENT)
Dept: OBSTETRICS AND GYNECOLOGY | Facility: CLINIC | Age: 45
End: 2025-06-24
Payer: COMMERCIAL

## 2025-06-24 DIAGNOSIS — N76.0 BV (BACTERIAL VAGINOSIS): Primary | ICD-10-CM

## 2025-06-24 DIAGNOSIS — B96.89 BV (BACTERIAL VAGINOSIS): Primary | ICD-10-CM

## 2025-06-25 RX ORDER — METRONIDAZOLE 500 MG/1
500 TABLET ORAL 2 TIMES DAILY
Qty: 14 TABLET | Refills: 0 | Status: SHIPPED | OUTPATIENT
Start: 2025-06-25 | End: 2025-07-02

## 2025-07-22 ENCOUNTER — APPOINTMENT (OUTPATIENT)
Dept: OBSTETRICS AND GYNECOLOGY | Facility: CLINIC | Age: 45
End: 2025-07-22
Payer: COMMERCIAL